# Patient Record
Sex: MALE | Race: WHITE | HISPANIC OR LATINO | Employment: FULL TIME | URBAN - METROPOLITAN AREA
[De-identification: names, ages, dates, MRNs, and addresses within clinical notes are randomized per-mention and may not be internally consistent; named-entity substitution may affect disease eponyms.]

---

## 2017-02-03 ENCOUNTER — OFFICE VISIT (OUTPATIENT)
Dept: ENDOCRINOLOGY | Facility: CLINIC | Age: 37
End: 2017-02-03

## 2017-02-03 VITALS
OXYGEN SATURATION: 96 % | SYSTOLIC BLOOD PRESSURE: 140 MMHG | HEIGHT: 65 IN | WEIGHT: 205 LBS | DIASTOLIC BLOOD PRESSURE: 84 MMHG | HEART RATE: 86 BPM | BODY MASS INDEX: 34.16 KG/M2

## 2017-02-03 DIAGNOSIS — M79.2 NEUROPATHIC PAIN: ICD-10-CM

## 2017-02-03 DIAGNOSIS — R79.89 ABNORMAL THYROID BLOOD TEST: Primary | ICD-10-CM

## 2017-02-03 LAB — TSH SERPL DL<=0.05 MIU/L-ACNC: 0.39 MIU/ML (ref 0.35–5.35)

## 2017-02-03 PROCEDURE — 84443 ASSAY THYROID STIM HORMONE: CPT | Performed by: INTERNAL MEDICINE

## 2017-02-03 PROCEDURE — 99202 OFFICE O/P NEW SF 15 MIN: CPT | Performed by: INTERNAL MEDICINE

## 2017-02-03 RX ORDER — DULOXETIN HYDROCHLORIDE 60 MG/1
60 CAPSULE, DELAYED RELEASE ORAL DAILY
Qty: 30 CAPSULE | Refills: 5 | Status: SHIPPED | OUTPATIENT
Start: 2017-02-03 | End: 2019-02-11

## 2017-02-03 NOTE — PROGRESS NOTES
Thyroid Problem (New pt, referred by  for abnormal thyroid labs. C/o not being able to sleep well at night. )    Leah Randhawa is a 36 y.o. male. he is being seen for consultation today at the request of  Camilla Durham A*     Consultation for abnormal thyroid function test in 10/16/2016 patient had a TSH of 0.3 with normal T4 of 9 free thyroxine index of 2.5 and normal thyroxine thyroid peroxidase antibodies.    Thyroglobulin antibodies were slightly elevated at 1.9 and this patient at this time patient presented for flareup of [employer] policy which he has since 2005. He received prednisone taper    [place] palsy dx in 2005, recurrent. In Oct 2016 he had flare up and   Patient lost his insurance and received isoniazid.     He c/o fatigue, no other sx to suggest hypothyroidism. Negative FH of thyroid disease,       History of Present Illness    Review of Systems  Review of Systems   Constitutional: Positive for fatigue and unexpected weight change (weight gain). Negative for activity change, appetite change, chills and diaphoresis.   HENT: Negative for congestion, ear pain, facial swelling, hearing loss, postnasal drip, sore throat, trouble swallowing and voice change.    Eyes: Negative.  Negative for redness, itching and visual disturbance.   Respiratory: Negative for cough and shortness of breath.    Cardiovascular: Negative for chest pain, palpitations and leg swelling.   Gastrointestinal: Negative for abdominal distention, abdominal pain, constipation, diarrhea, nausea and vomiting.   Endocrine:        As listed in HPI   Genitourinary: Negative.    Musculoskeletal: Positive for arthralgias and back pain. Negative for joint swelling, myalgias, neck pain and neck stiffness.   Skin: Negative.         Itching of the hands and feet.    Allergic/Immunologic: Negative.    Neurological: Positive for weakness (left sided facial weakness). Negative for dizziness, tremors, syncope,  "light-headedness and headaches.   Hematological: Negative.    Psychiatric/Behavioral: Positive for sleep disturbance.   All other systems reviewed and are negative.    Current medications:  Current Outpatient Prescriptions   Medication Sig Dispense Refill   • DULoxetine (CYMBALTA) 60 MG capsule Take 1 capsule by mouth Daily. 30 capsule 5     No current facility-administered medications for this visit.        The following portions of the patient's history were reviewed and updated as appropriate: He  has a past medical history of Bell's palsy and Obesity.  He  has a past surgical history that includes No past surgeries.  His family history includes Diabetes in his mother.  He  reports that he has never smoked. He does not have any smokeless tobacco history on file. He reports that he does not drink alcohol or use illicit drugs.  He is allergic to gabapentin..        Objective      Vitals:    02/03/17 0902   BP: 140/84   Pulse: 86   SpO2: 96%   Weight: 205 lb (93 kg)   Height: 65\" (165.1 cm)   Body mass index is 34.11 kg/(m^2).  Physical Exam   Constitutional: He is oriented to person, place, and time. He appears well-developed and well-nourished.   HENT:   Head: Normocephalic and atraumatic.   Mouth/Throat: Oropharynx is clear and moist.   Eyes: Conjunctivae are normal.   Neck: Normal range of motion. No muscular tenderness present. Carotid bruit is not present. No thyroid mass and no thyromegaly present.   The neck is supple and no assimetry visualized   Cardiovascular: Normal rate, regular rhythm and normal heart sounds.    Pulmonary/Chest: Effort normal and breath sounds normal.   Musculoskeletal: He exhibits no edema.   Lymphadenopathy:     He has no cervical adenopathy.   Neurological: He is alert and oriented to person, place, and time.   Skin: Skin is warm. No rash noted.   Psychiatric: He has a normal mood and affect. Thought content normal.   Vitals reviewed.      LABS AND IMAGING  No visits with results " within 1 Month(s) from this visit.  Latest known visit with results is:    Office Visit on 10/12/2016   Component Date Value Ref Range Status   • Thyroid Peroxidase Antibody 10/12/2016 12  0 - 34 IU/mL Final   • Thyroglobulin Ab 10/12/2016 1.9* 0.0 - 0.9 IU/mL Final    Thyroglobulin Antibody measured by [facility] Methodology   • TSH 10/12/2016 0.312* 0.350 - 5.350 mIU/mL Final   • T3 Uptake 10/12/2016 27.8  23.0 - 37.0 % Final   • T4, Total 10/12/2016 9.00  4.70 - 11.40 mcg/dL Final   • Free Thyroxine Index 10/12/2016 2.5  TBI Final       Assessment/Plan       Problem List Items Addressed This Visit        Other    Abnormal thyroid blood test - Primary    Relevant Orders    TSH      Other Visit Diagnoses     Neuropathic pain        Relevant Medications    DULoxetine (CYMBALTA) 60 MG capsule             PLAN  Repeat TSH. Mildly suppressed TSH - likely presentation of sick euthyroid syndrome or steroid effect.     Orders Placed This Encounter   Procedures   • TSH       Follow-up: prn.

## 2019-02-11 ENCOUNTER — OFFICE VISIT (OUTPATIENT)
Dept: FAMILY MEDICINE CLINIC | Facility: CLINIC | Age: 39
End: 2019-02-11

## 2019-02-11 VITALS
HEART RATE: 105 BPM | DIASTOLIC BLOOD PRESSURE: 70 MMHG | OXYGEN SATURATION: 97 % | WEIGHT: 192.2 LBS | BODY MASS INDEX: 30.89 KG/M2 | RESPIRATION RATE: 18 BRPM | TEMPERATURE: 97.4 F | SYSTOLIC BLOOD PRESSURE: 124 MMHG | HEIGHT: 66 IN

## 2019-02-11 DIAGNOSIS — Z00.00 HEALTHCARE MAINTENANCE: Primary | ICD-10-CM

## 2019-02-11 LAB
ALBUMIN SERPL-MCNC: 4.82 G/DL (ref 3.2–4.8)
ALBUMIN/GLOB SERPL: 1.9 G/DL (ref 1.5–2.5)
ALP SERPL-CCNC: 78 U/L (ref 25–100)
ALT SERPL W P-5'-P-CCNC: 31 U/L (ref 7–40)
ANION GAP SERPL CALCULATED.3IONS-SCNC: 5 MMOL/L (ref 3–11)
ARTICHOKE IGE QN: 119 MG/DL (ref 0–130)
AST SERPL-CCNC: 20 U/L (ref 0–33)
BASOPHILS # BLD AUTO: 0.04 10*3/MM3 (ref 0–0.2)
BASOPHILS NFR BLD AUTO: 0.5 % (ref 0–1)
BILIRUB BLD-MCNC: NEGATIVE MG/DL
BILIRUB SERPL-MCNC: 0.5 MG/DL (ref 0.3–1.2)
BUN BLD-MCNC: 18 MG/DL (ref 9–23)
BUN/CREAT SERPL: 15.5 (ref 7–25)
CALCIUM SPEC-SCNC: 9.8 MG/DL (ref 8.7–10.4)
CHLORIDE SERPL-SCNC: 103 MMOL/L (ref 99–109)
CHOLEST SERPL-MCNC: 166 MG/DL (ref 0–200)
CLARITY, POC: CLEAR
CO2 SERPL-SCNC: 31 MMOL/L (ref 20–31)
COLOR UR: YELLOW
CREAT BLD-MCNC: 1.16 MG/DL (ref 0.6–1.3)
DEPRECATED RDW RBC AUTO: 40.5 FL (ref 37–54)
EOSINOPHIL # BLD AUTO: 0.06 10*3/MM3 (ref 0–0.3)
EOSINOPHIL NFR BLD AUTO: 0.7 % (ref 0–3)
ERYTHROCYTE [DISTWIDTH] IN BLOOD BY AUTOMATED COUNT: 12.7 % (ref 11.3–14.5)
GFR SERPL CREATININE-BSD FRML MDRD: 70 ML/MIN/1.73
GLOBULIN UR ELPH-MCNC: 2.6 GM/DL
GLUCOSE BLD-MCNC: 118 MG/DL (ref 70–100)
GLUCOSE UR STRIP-MCNC: NEGATIVE MG/DL
HBA1C MFR BLD: 5 % (ref 4.8–5.6)
HCT VFR BLD AUTO: 45.7 % (ref 38.9–50.9)
HDLC SERPL-MCNC: 39 MG/DL (ref 40–60)
HGB BLD-MCNC: 15.6 G/DL (ref 13.1–17.5)
HIV1+2 AB SER QL: NORMAL
IMM GRANULOCYTES # BLD AUTO: 0.03 10*3/MM3 (ref 0–0.05)
IMM GRANULOCYTES NFR BLD AUTO: 0.3 % (ref 0–0.6)
KETONES UR QL: NEGATIVE
LEUKOCYTE EST, POC: NEGATIVE
LYMPHOCYTES # BLD AUTO: 2.77 10*3/MM3 (ref 0.6–4.8)
LYMPHOCYTES NFR BLD AUTO: 31.9 % (ref 24–44)
MCH RBC QN AUTO: 29.8 PG (ref 27–31)
MCHC RBC AUTO-ENTMCNC: 34.1 G/DL (ref 32–36)
MCV RBC AUTO: 87.4 FL (ref 80–99)
MONOCYTES # BLD AUTO: 0.43 10*3/MM3 (ref 0–1)
MONOCYTES NFR BLD AUTO: 5 % (ref 0–12)
NEUTROPHILS # BLD AUTO: 5.38 10*3/MM3 (ref 1.5–8.3)
NEUTROPHILS NFR BLD AUTO: 61.9 % (ref 41–71)
NITRITE UR-MCNC: NEGATIVE MG/ML
PH UR: 8.5 [PH] (ref 5–8)
PLATELET # BLD AUTO: 350 10*3/MM3 (ref 150–450)
PMV BLD AUTO: 10.8 FL (ref 6–12)
POC CREATININE URINE: 150
POC MICROALBUMIN URINE: 30
POTASSIUM BLD-SCNC: 3.8 MMOL/L (ref 3.5–5.5)
PROT SERPL-MCNC: 7.4 G/DL (ref 5.7–8.2)
PROT UR STRIP-MCNC: NEGATIVE MG/DL
RBC # BLD AUTO: 5.23 10*6/MM3 (ref 4.2–5.76)
RBC # UR STRIP: NEGATIVE /UL
SODIUM BLD-SCNC: 139 MMOL/L (ref 132–146)
SP GR UR: 1 (ref 1–1.03)
T4 FREE SERPL-MCNC: 1.35 NG/DL (ref 0.89–1.76)
TRIGL SERPL-MCNC: 133 MG/DL (ref 0–150)
TSH SERPL DL<=0.05 MIU/L-ACNC: 0.1 MIU/ML (ref 0.35–5.35)
URATE SERPL-MCNC: 6.8 MG/DL (ref 3.7–9.2)
UROBILINOGEN UR QL: NORMAL
WBC NRBC COR # BLD: 8.68 10*3/MM3 (ref 3.5–10.8)

## 2019-02-11 PROCEDURE — 84443 ASSAY THYROID STIM HORMONE: CPT | Performed by: FAMILY MEDICINE

## 2019-02-11 PROCEDURE — 80053 COMPREHEN METABOLIC PANEL: CPT | Performed by: FAMILY MEDICINE

## 2019-02-11 PROCEDURE — 36415 COLL VENOUS BLD VENIPUNCTURE: CPT | Performed by: FAMILY MEDICINE

## 2019-02-11 PROCEDURE — 84550 ASSAY OF BLOOD/URIC ACID: CPT | Performed by: FAMILY MEDICINE

## 2019-02-11 PROCEDURE — G0432 EIA HIV-1/HIV-2 SCREEN: HCPCS | Performed by: FAMILY MEDICINE

## 2019-02-11 PROCEDURE — 85025 COMPLETE CBC W/AUTO DIFF WBC: CPT | Performed by: FAMILY MEDICINE

## 2019-02-11 PROCEDURE — 84480 ASSAY TRIIODOTHYRONINE (T3): CPT | Performed by: FAMILY MEDICINE

## 2019-02-11 PROCEDURE — 84439 ASSAY OF FREE THYROXINE: CPT | Performed by: FAMILY MEDICINE

## 2019-02-11 PROCEDURE — 80061 LIPID PANEL: CPT | Performed by: FAMILY MEDICINE

## 2019-02-11 PROCEDURE — 81003 URINALYSIS AUTO W/O SCOPE: CPT | Performed by: FAMILY MEDICINE

## 2019-02-11 PROCEDURE — 83036 HEMOGLOBIN GLYCOSYLATED A1C: CPT | Performed by: FAMILY MEDICINE

## 2019-02-11 PROCEDURE — 99395 PREV VISIT EST AGE 18-39: CPT | Performed by: FAMILY MEDICINE

## 2019-02-11 PROCEDURE — 82044 UR ALBUMIN SEMIQUANTITATIVE: CPT | Performed by: FAMILY MEDICINE

## 2019-02-11 NOTE — PROGRESS NOTES
Leah Randhawa is a 38 y.o. male.     History of Present Illness   He is here for his annual fasting wellness evaluation.  He is current on his immunizations.  He voices no acute symptoms.  He describes non-compliance with his endocrinology follow up.    Review of Systems   Constitutional: Negative.    HENT: Positive for congestion and postnasal drip.    Eyes: Negative.    Respiratory: Negative.    Cardiovascular: Negative.    Gastrointestinal: Negative.    Endocrine: Negative.    Genitourinary: Negative.    Musculoskeletal: Negative.    Skin: Negative.    Allergic/Immunologic: Negative.    Neurological: Negative.    Hematological: Negative.    Psychiatric/Behavioral: Negative.        Objective   Physical Exam   Constitutional: He is oriented to person, place, and time. He appears well-developed and well-nourished. He is cooperative.   HENT:   Head: Normocephalic and atraumatic.   Right Ear: Hearing and external ear normal.   Left Ear: Hearing and external ear normal.   Nose: Mucosal edema and rhinorrhea present.   Mouth/Throat: Uvula is midline, oropharynx is clear and moist and mucous membranes are normal.   Eyes: Conjunctivae and EOM are normal. Pupils are equal, round, and reactive to light. No scleral icterus.   Neck: Trachea normal and normal range of motion. Neck supple. No JVD present. Carotid bruit is not present. No thyromegaly present.   Cardiovascular: Normal rate, regular rhythm, normal heart sounds and intact distal pulses.   Pulmonary/Chest: Effort normal and breath sounds normal.   Abdominal: Soft. Bowel sounds are normal. There is no hepatosplenomegaly. There is no tenderness.   Musculoskeletal: Normal range of motion.   Lymphadenopathy:     He has no cervical adenopathy.   Neurological: He is alert and oriented to person, place, and time. He has normal strength and normal reflexes. No sensory deficit. Gait normal.   Skin: Skin is warm and dry.   Psychiatric: He has a normal mood and  affect. His speech is normal and behavior is normal. Judgment and thought content normal. Cognition and memory are normal.   Nursing note and vitals reviewed.      Assessment/Plan   Diagnoses and all orders for this visit:    Healthcare maintenance  -     POC Urinalysis Dipstick, Automated  -     Comprehensive Metabolic Panel  -     CBC & Differential  -     POC Microalbumin  -     Lipid Panel  -     TSH  -     T4, Free  -     T3  -     Uric Acid  -     HIV-1 / O / 2 Ag / Antibody 4th Generation  -     Hemoglobin A1c    The patient is here for a health maintenance visit.  Currently, the patient consumes a calorie enriched diet and has an inadequate exercise regimen. Screening lab work is ordered.  Immunizations are reported as current.  Advice and education is given regarding nutrition, aerobic exercise, routine dental evaluations, routine eye exams, reproductive health, cardiovascular risk reduction, sunscreen use, self skin examination (annual dermatology evaluations) and seat belt use (general overall safety).  Further recommendations after lab evaluation.  Annual wellness evaluations recommended.

## 2019-02-13 LAB — T3 SERPL-MCNC: 156 NG/DL (ref 71–180)

## 2019-03-01 ENCOUNTER — TELEPHONE (OUTPATIENT)
Dept: FAMILY MEDICINE CLINIC | Facility: CLINIC | Age: 39
End: 2019-03-01

## 2019-03-01 NOTE — TELEPHONE ENCOUNTER
----- Message from Bri Fitzgerald sent at 3/1/2019 12:31 PM EST -----  Regarding: QUESTIONS ABOUT LAB RESULTS  Contact: [phone]  Patient called and has questions about his lab results.  Please call and advise @ [phone].  Thank you.

## 2019-07-29 ENCOUNTER — OFFICE VISIT (OUTPATIENT)
Dept: NEUROLOGY | Facility: CLINIC | Age: 39
End: 2019-07-29

## 2019-07-29 VITALS
HEART RATE: 88 BPM | SYSTOLIC BLOOD PRESSURE: 148 MMHG | OXYGEN SATURATION: 100 % | HEIGHT: 61 IN | DIASTOLIC BLOOD PRESSURE: 90 MMHG | BODY MASS INDEX: 39.08 KG/M2 | WEIGHT: 207 LBS

## 2019-07-29 DIAGNOSIS — M54.2 CERVICAL PAIN (NECK): Primary | ICD-10-CM

## 2019-07-29 DIAGNOSIS — M54.2 MUSCULOSKELETAL NECK PAIN: ICD-10-CM

## 2019-07-29 DIAGNOSIS — R22.1 LOCALIZED SWELLING, MASS AND LUMP, NECK: ICD-10-CM

## 2019-07-29 PROBLEM — M54.81 BILATERAL OCCIPITAL NEURALGIA: Status: ACTIVE | Noted: 2019-07-29

## 2019-07-29 PROCEDURE — 99214 OFFICE O/P EST MOD 30 MIN: CPT | Performed by: NURSE PRACTITIONER

## 2019-07-29 RX ORDER — CYCLOBENZAPRINE HCL 5 MG
5 TABLET ORAL NIGHTLY PRN
Qty: 30 TABLET | Refills: 1 | Status: SHIPPED | OUTPATIENT
Start: 2019-07-29

## 2019-07-29 RX ORDER — NAPROXEN 500 MG/1
500 TABLET ORAL 2 TIMES DAILY WITH MEALS
Qty: 28 TABLET | Refills: 0 | Status: SHIPPED | OUTPATIENT
Start: 2019-07-29

## 2019-07-29 RX ORDER — PREDNISONE 10 MG/1
TABLET ORAL
Qty: 42 TABLET | Refills: 0 | Status: SHIPPED | OUTPATIENT
Start: 2019-07-29 | End: 2019-09-10 | Stop reason: HOSPADM

## 2019-07-29 NOTE — PROGRESS NOTES
Subjective:     Patient ID: Feliz Randhawa is a 39 y.o. male.    CC:   Chief Complaint   Patient presents with   • Neck Pain     nerve pain       HPI:   History of Present Illness   This is a pleasant 39-year-old female who presents for neurology follow-up on new and worsening symptoms.  He was initially seen in our office in 2016 for chronic left Bell's palsy present since 2005.  He also had neuralgia as in the left face, left arm and somewhat moving to the right side at that time.  He did have an MRI of the brain without contrast on 8/18/2016 which was within normal limits and cranial nerves were within normal limits.  Previous labs in 2016 showed CRP normal, methylmalonic acid normal sed rate normal vitamin B12 on the low side at 342, hemoglobin A1c 4.9%, CMP within normal limits, DOUG with reflex negative, CBC with differential within normal limits and TSH on the low side of normal with normal T3 uptake and total T4.  Previously was treated with amitriptyline and gabapentin and did not tolerate either of these medications.  We did try Cymbalta but he also was not able to tolerate this.  Unfortunately he was lost to follow-up until today.    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  He tells me that he feels like his neck swells and he has neck pain in the back of his neck going into his shoulders.  He has not had any imaging of his neck.  He has not tried any physical therapy or conservative treatment.  He denies any specific injuries but does feel like it is related to tension and tightness.  He tells me that when his wife will massage his neck he feels a lot of tension release. He notices tightness, moderate dull and achy pain, numbness and tingling up into his head and sometimes into his arms but no weakness.     He tells me also that he feels like his neck swells and  gets swollen lymph nodes and he has not had this addressed by his PCP.  He tells me this comes and goes and sometimes he feels like it is hard to swallow.  He did have labs completed in February 2019 with his PCP and CBC with differential was within normal limits, CMP was showing a glucose of 118 but otherwise normal, lipid panel total cholesterol 166, triglycerides 133, HDL 39 and , TSH still low at 0.105, free T4 normal at 1.35, T3 normal at 156, uric acid normal at 6.8, HIV negative, hemoglobin A1c 5.0 normal, urine microalbumin normal.  He has not seen his PCP for follow-up or issues with the neck swelling and lymph node.    The following portions of the patient's history were reviewed and updated as appropriate: allergies, current medications, past family history, past medical history, past social history, past surgical history and problem list.    Past Medical History:   Diagnosis Date   • Bell's palsy    • Obesity        Past Surgical History:   Procedure Laterality Date   • NO PAST SURGERIES         Social History     Socioeconomic History   • Marital status: Significant Other     Spouse name: Sasha   • Number of children: 1   • Years of education: H.S.   • Highest education level: High school graduate   Occupational History   • Occupation: Maintenance     Employer: [employer]   Tobacco Use   • Smoking status: Never Smoker   • Smokeless tobacco: Never Used   Substance and Sexual Activity   • Alcohol use: No   • Drug use: No   • Sexual activity: Defer     Partners: Female       Family History   Problem Relation Age of Onset   • Diabetes Mother    • Nephrolithiasis Mother    • No Known Problems Father         Review of Systems   All other systems reviewed and are negative.       Objective:    Neurologic Exam     Mental Status   Oriented to person, place, and time.   Level of consciousness: alert    Cranial Nerves     CN II   Visual fields full to confrontation.     CN III, IV, VI    Pupils are equal, round, and reactive to light.  Extraocular motions are normal.     CN V   Facial sensation intact.     CN VII   Right facial weakness: none  Left facial weakness: peripheral (chronic left facial weakness from Bell's palsy)    CN VIII   CN VIII normal.     CN IX, X   CN IX normal.   CN X normal.     CN XI   CN XI normal.     CN XII   CN XII normal.     Motor Exam   Muscle bulk: normal  Overall muscle tone: normal    Strength   Strength 5/5 except as noted.   Right neck flexion: 4/5  Left neck flexion: 4/5  Right neck extension: 4/5  Left neck extension: 4/5    Sensory Exam   Light touch normal.   Vibration normal.   Proprioception normal.   Pinprick normal.     Gait, Coordination, and Reflexes     Gait  Gait: normal    Coordination   Finger to nose coordination: normal    Tremor   Resting tremor: absent  Intention tremor: absent  Action tremor: absent    Reflexes   Right brachioradialis: 2+  Left brachioradialis: 2+  Right biceps: 2+  Left biceps: 2+  Right triceps: 2+  Left triceps: 2+  Right patellar: 2+  Left patellar: 2+  Right achilles: 2+  Left achilles: 2+  Right : 2+  Left : 2+      Physical Exam   Constitutional: He is oriented to person, place, and time.   Eyes: EOM are normal. Pupils are equal, round, and reactive to light.   Neck: Muscular tenderness present. No spinous process tenderness present. No neck rigidity. Decreased range of motion present. No edema and no erythema present.   Musculoskeletal:        Cervical back: He exhibits decreased range of motion, tenderness, pain and spasm. He exhibits no swelling, no edema, no deformity and no laceration.   Lymphadenopathy:     He has cervical adenopathy (left-ordered u/s).   Neurological: He is oriented to person, place, and time. He has a normal Finger-Nose-Finger Test. Gait normal.   Reflex Scores:       Tricep reflexes are 2+ on the right side and 2+ on the left side.       Bicep reflexes are 2+ on the right side and 2+ on  the left side.       Brachioradialis reflexes are 2+ on the right side and 2+ on the left side.       Patellar reflexes are 2+ on the right side and 2+ on the left side.       Achilles reflexes are 2+ on the right side and 2+ on the left side.  Psychiatric: His mood appears anxious.       Assessment/Plan:       Feliz was seen today for neck pain.    Diagnoses and all orders for this visit:    Cervical pain (neck)  -     XR Spine Cervical 2 or 3 View; Future  -     predniSONE (DELTASONE) 10 MG tablet; Take 6 tabs x 2 days, Take 5 tabs x 2 days, take 4 tabs x 2 days, take 3 tabs x 2 days, take 2 tabs x 2 days and 1 tab x 2 days and stop  -     cyclobenzaprine (FLEXERIL) 5 MG tablet; Take 1 tablet by mouth At Night As Needed for Muscle Spasms.  -     naproxen (EC NAPROSYN) 500 MG EC tablet; Take 1 tablet by mouth 2 (Two) Times a Day With Meals.  -     Ambulatory Referral to Physical Therapy Evaluate and treat    Musculoskeletal neck pain  Comments:  if cannot afford PT consider massage or chiropractor    Localized swelling, mass and lump, neck  -     US head neck soft tissue; Future         He does have a high co-pay undetectable but I have encouraged him to try physical therapy for his neck pain as well as get an x-ray of his C-spine.  We will try a steroid lead for the next 12 days as well as Flexeril at night and naproxen for 2 weeks.  We are going to have him follow-up in 6 weeks and if he is not improving we would consider an MRI of the neck.  I also ordered an ultrasound of his neck soft tissue to evaluate the lymphadenopathy.  I have also encouraged him to follow-up with his primary care provider for further evaluation and work-up. Reviewed medications, potential side effects and signs and symptoms to report. Discussed risk versus benefits of treatment plan with patient and/or family-including medications, labs and radiology that may be ordered. Addressed questions and concerns during visit. Patient and/or  family verbalized understanding and agree with plan. No SOB or dysphagia.     During this visit the following were done:  Labs Reviewed [x]    Labs Ordered []    Radiology Reports Reviewed [x]    Radiology Ordered [x]    PCP Records Reviewed [x]    Referring Provider Records Reviewed []    ER Records Reviewed []    Hospital Records Reviewed []    History Obtained From Family []    Radiology Images Reviewed []    Other Reviewed []    Records Requested []      EMR Dragon/Transcription Disclaimer:  Much of this encounter note is an electronic transcription of spoken language to printed text. Electronic transcription of spoken language may permit erroneous words or phrases to be inadvertently transcribed. Although I have reviewed the note for such errors, some may still exist in this documentation.      Camilla Durham, APRN  7/29/2019

## 2019-08-01 ENCOUNTER — APPOINTMENT (OUTPATIENT)
Dept: ULTRASOUND IMAGING | Facility: HOSPITAL | Age: 39
End: 2019-08-01

## 2019-09-09 ENCOUNTER — APPOINTMENT (OUTPATIENT)
Dept: GENERAL RADIOLOGY | Facility: HOSPITAL | Age: 39
End: 2019-09-09

## 2019-09-09 ENCOUNTER — APPOINTMENT (OUTPATIENT)
Dept: CT IMAGING | Facility: HOSPITAL | Age: 39
End: 2019-09-09

## 2019-09-09 ENCOUNTER — OFFICE VISIT (OUTPATIENT)
Dept: NEUROLOGY | Facility: CLINIC | Age: 39
End: 2019-09-09

## 2019-09-09 ENCOUNTER — HOSPITAL ENCOUNTER (OUTPATIENT)
Facility: HOSPITAL | Age: 39
Setting detail: OBSERVATION
Discharge: HOME | End: 2019-09-10
Attending: EMERGENCY MEDICINE | Admitting: INTERNAL MEDICINE

## 2019-09-09 VITALS
SYSTOLIC BLOOD PRESSURE: 200 MMHG | DIASTOLIC BLOOD PRESSURE: 130 MMHG | OXYGEN SATURATION: 99 % | HEART RATE: 108 BPM | WEIGHT: 205 LBS | HEIGHT: 61 IN | BODY MASS INDEX: 38.71 KG/M2

## 2019-09-09 DIAGNOSIS — R55 NEAR SYNCOPE: ICD-10-CM

## 2019-09-09 DIAGNOSIS — M54.2 MUSCULOSKELETAL NECK PAIN: ICD-10-CM

## 2019-09-09 DIAGNOSIS — M54.2 CERVICAL SPINE PAIN: ICD-10-CM

## 2019-09-09 DIAGNOSIS — I16.0 HYPERTENSIVE URGENCY: ICD-10-CM

## 2019-09-09 DIAGNOSIS — G51.0 BELL'S PALSY: ICD-10-CM

## 2019-09-09 DIAGNOSIS — E87.6 HYPOKALEMIA: Primary | ICD-10-CM

## 2019-09-09 DIAGNOSIS — I16.0 HYPERTENSIVE URGENCY: Primary | ICD-10-CM

## 2019-09-09 DIAGNOSIS — R51.9 NONINTRACTABLE HEADACHE, UNSPECIFIED CHRONICITY PATTERN, UNSPECIFIED HEADACHE TYPE: ICD-10-CM

## 2019-09-09 LAB
ALBUMIN SERPL-MCNC: 4.8 G/DL (ref 3.5–5.2)
ALBUMIN/GLOB SERPL: 1.9 G/DL
ALP SERPL-CCNC: 76 U/L (ref 39–117)
ALT SERPL W P-5'-P-CCNC: 50 U/L (ref 1–41)
ANION GAP SERPL CALCULATED.3IONS-SCNC: 11 MMOL/L (ref 5–15)
AST SERPL-CCNC: 26 U/L (ref 1–40)
BASOPHILS # BLD AUTO: 0.07 10*3/MM3 (ref 0–0.2)
BASOPHILS NFR BLD AUTO: 0.8 % (ref 0–1.5)
BILIRUB SERPL-MCNC: 0.3 MG/DL (ref 0.2–1.2)
BILIRUB UR QL STRIP: NEGATIVE
BUN BLD-MCNC: 13 MG/DL (ref 6–20)
BUN/CREAT SERPL: 11 (ref 7–25)
CALCIUM SPEC-SCNC: 9.3 MG/DL (ref 8.6–10.5)
CHLORIDE SERPL-SCNC: 103 MMOL/L (ref 98–107)
CLARITY UR: CLEAR
CO2 SERPL-SCNC: 28 MMOL/L (ref 22–29)
COLOR UR: YELLOW
CREAT BLD-MCNC: 1.18 MG/DL (ref 0.76–1.27)
DEPRECATED RDW RBC AUTO: 36.7 FL (ref 37–54)
EOSINOPHIL # BLD AUTO: 0.03 10*3/MM3 (ref 0–0.4)
EOSINOPHIL NFR BLD AUTO: 0.3 % (ref 0.3–6.2)
ERYTHROCYTE [DISTWIDTH] IN BLOOD BY AUTOMATED COUNT: 11.9 % (ref 12.3–15.4)
GFR SERPL CREATININE-BSD FRML MDRD: 69 ML/MIN/1.73
GLOBULIN UR ELPH-MCNC: 2.5 GM/DL
GLUCOSE BLD-MCNC: 95 MG/DL (ref 65–99)
GLUCOSE UR STRIP-MCNC: NEGATIVE MG/DL
HCT VFR BLD AUTO: 44 % (ref 37.5–51)
HGB BLD-MCNC: 15.2 G/DL (ref 13–17.7)
HGB UR QL STRIP.AUTO: NEGATIVE
IMM GRANULOCYTES # BLD AUTO: 0.04 10*3/MM3 (ref 0–0.05)
IMM GRANULOCYTES NFR BLD AUTO: 0.5 % (ref 0–0.5)
KETONES UR QL STRIP: NEGATIVE
LEUKOCYTE ESTERASE UR QL STRIP.AUTO: NEGATIVE
LYMPHOCYTES # BLD AUTO: 2.64 10*3/MM3 (ref 0.7–3.1)
LYMPHOCYTES NFR BLD AUTO: 30.6 % (ref 19.6–45.3)
MCH RBC QN AUTO: 29.3 PG (ref 26.6–33)
MCHC RBC AUTO-ENTMCNC: 34.5 G/DL (ref 31.5–35.7)
MCV RBC AUTO: 84.9 FL (ref 79–97)
MONOCYTES # BLD AUTO: 0.46 10*3/MM3 (ref 0.1–0.9)
MONOCYTES NFR BLD AUTO: 5.3 % (ref 5–12)
NEUTROPHILS # BLD AUTO: 5.4 10*3/MM3 (ref 1.7–7)
NEUTROPHILS NFR BLD AUTO: 62.5 % (ref 42.7–76)
NITRITE UR QL STRIP: NEGATIVE
NRBC BLD AUTO-RTO: 0 /100 WBC (ref 0–0.2)
NT-PROBNP SERPL-MCNC: 55.3 PG/ML (ref 5–450)
PH UR STRIP.AUTO: 6.5 [PH] (ref 5–8)
PLATELET # BLD AUTO: 320 10*3/MM3 (ref 140–450)
PMV BLD AUTO: 9.7 FL (ref 6–12)
POTASSIUM BLD-SCNC: 3.2 MMOL/L (ref 3.5–5.2)
PROT SERPL-MCNC: 7.3 G/DL (ref 6–8.5)
PROT UR QL STRIP: NEGATIVE
RBC # BLD AUTO: 5.18 10*6/MM3 (ref 4.14–5.8)
SODIUM BLD-SCNC: 142 MMOL/L (ref 136–145)
SP GR UR STRIP: 1.02 (ref 1–1.03)
TROPONIN T SERPL-MCNC: <0.01 NG/ML (ref 0–0.03)
TSH SERPL DL<=0.05 MIU/L-ACNC: 0.53 UIU/ML (ref 0.27–4.2)
UROBILINOGEN UR QL STRIP: NORMAL
WBC NRBC COR # BLD: 8.64 10*3/MM3 (ref 3.4–10.8)

## 2019-09-09 PROCEDURE — 83880 ASSAY OF NATRIURETIC PEPTIDE: CPT | Performed by: PHYSICIAN ASSISTANT

## 2019-09-09 PROCEDURE — 80053 COMPREHEN METABOLIC PANEL: CPT | Performed by: PHYSICIAN ASSISTANT

## 2019-09-09 PROCEDURE — 96376 TX/PRO/DX INJ SAME DRUG ADON: CPT

## 2019-09-09 PROCEDURE — 84443 ASSAY THYROID STIM HORMONE: CPT | Performed by: PHYSICIAN ASSISTANT

## 2019-09-09 PROCEDURE — 99220 PR INITIAL OBSERVATION CARE/DAY 70 MINUTES: CPT | Performed by: INTERNAL MEDICINE

## 2019-09-09 PROCEDURE — 93005 ELECTROCARDIOGRAM TRACING: CPT | Performed by: PHYSICIAN ASSISTANT

## 2019-09-09 PROCEDURE — 72040 X-RAY EXAM NECK SPINE 2-3 VW: CPT

## 2019-09-09 PROCEDURE — 99285 EMERGENCY DEPT VISIT HI MDM: CPT

## 2019-09-09 PROCEDURE — 96374 THER/PROPH/DIAG INJ IV PUSH: CPT

## 2019-09-09 PROCEDURE — 99214 OFFICE O/P EST MOD 30 MIN: CPT | Performed by: NURSE PRACTITIONER

## 2019-09-09 PROCEDURE — 71045 X-RAY EXAM CHEST 1 VIEW: CPT

## 2019-09-09 PROCEDURE — 84484 ASSAY OF TROPONIN QUANT: CPT | Performed by: PHYSICIAN ASSISTANT

## 2019-09-09 PROCEDURE — 85025 COMPLETE CBC W/AUTO DIFF WBC: CPT | Performed by: PHYSICIAN ASSISTANT

## 2019-09-09 PROCEDURE — 81003 URINALYSIS AUTO W/O SCOPE: CPT | Performed by: PHYSICIAN ASSISTANT

## 2019-09-09 PROCEDURE — 70450 CT HEAD/BRAIN W/O DYE: CPT

## 2019-09-09 RX ORDER — AMLODIPINE BESYLATE 5 MG/1
5 TABLET ORAL
Status: DISCONTINUED | OUTPATIENT
Start: 2019-09-09 | End: 2019-09-10

## 2019-09-09 RX ORDER — POTASSIUM CHLORIDE 750 MG/1
40 CAPSULE, EXTENDED RELEASE ORAL ONCE
Status: COMPLETED | OUTPATIENT
Start: 2019-09-09 | End: 2019-09-09

## 2019-09-09 RX ORDER — LABETALOL HYDROCHLORIDE 5 MG/ML
10 INJECTION, SOLUTION INTRAVENOUS ONCE
Status: COMPLETED | OUTPATIENT
Start: 2019-09-09 | End: 2019-09-09

## 2019-09-09 RX ADMIN — POTASSIUM CHLORIDE 40 MEQ: 750 CAPSULE, EXTENDED RELEASE ORAL at 22:05

## 2019-09-09 RX ADMIN — LABETALOL 20 MG/4 ML (5 MG/ML) INTRAVENOUS SYRINGE 10 MG: at 23:40

## 2019-09-09 RX ADMIN — LABETALOL 20 MG/4 ML (5 MG/ML) INTRAVENOUS SYRINGE 10 MG: at 21:59

## 2019-09-09 NOTE — PROGRESS NOTES
Subjective:     Patient ID: Feliz Randhawa is a 39 y.o. male.    CC:   Chief Complaint   Patient presents with   • Neck Pain       HPI:   History of Present Illness   This is a pleasant 39-year-old female who presents for neurology follow-up on neck pain and tightness for 5 months.  He was initially seen in our office in 2016 for chronic left Bell's palsy present since 2005.  He also had neuralgia as in the left face, left arm and somewhat moving to the right side at that time.  He did have an MRI of the brain without contrast on 8/18/2016 which was within normal limits and cranial nerves were within normal limits.  Previous labs in 2016 showed CRP normal, methylmalonic acid normal sed rate normal vitamin B12 on the low side at 342, hemoglobin A1c 4.9%, CMP within normal limits, DOUG with reflex negative, CBC with differential within normal limits and TSH on the low side of normal with normal T3 uptake and total T4.  Previously was treated with amitriptyline and gabapentin and did not tolerate either of these medications.  We did try Cymbalta but he also was not able to tolerate this.      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  He tells me that he feels like his neck swells and he has neck pain in the back of his neck going into his shoulders.  He has not had any imaging of his neck.  He has not tried any physical therapy or conservative treatment.  He denies any specific injuries but does feel like it is related to tension and tightness.  He tells me that when his wife will massage his neck he feels a lot of tension release. He notices tightness, moderate dull and achy pain, numbness and tingling up into his head and sometimes into his arms but no weakness. Ordered xray c spine last visit and he could not afford the $700 copay and also ordered an u/s neck for swollen lymph node on left and could not afford. Canceled  both. Steroid and naproxen not helpful. Flexeril not helpful. Cannot get into PT with his work schedule.    HTN today significant and symptomatic-no hx HTN to his knowledge in past. /130 and 170/128 taken 15 minutes apart in visit. Had bad headache & yesterday. Has blurred vision and double vision yesterday and today. No slurred speech, acute weakness or paresthesias.     The following portions of the patient's history were reviewed and updated as appropriate: allergies, current medications, past family history, past medical history, past social history, past surgical history and problem list.    Past Medical History:   Diagnosis Date   • Bell's palsy    • Obesity        Past Surgical History:   Procedure Laterality Date   • NO PAST SURGERIES         Social History     Socioeconomic History   • Marital status: Significant Other     Spouse name: Sasha   • Number of children: 1   • Years of education: H.S.   • Highest education level: High school graduate   Occupational History   • Occupation: Maintenance     Employer: [employer]   Tobacco Use   • Smoking status: Never Smoker   • Smokeless tobacco: Never Used   Substance and Sexual Activity   • Alcohol use: No   • Drug use: No   • Sexual activity: Defer     Partners: Female       Family History   Problem Relation Age of Onset   • Diabetes Mother    • Nephrolithiasis Mother    • No Known Problems Father         Review of Systems   Constitutional: Negative for chills, fatigue, fever and unexpected weight change.   HENT: Negative for ear pain, hearing loss, nosebleeds, rhinorrhea and sore throat.    Eyes: Negative for photophobia, pain, discharge, itching and visual disturbance.   Respiratory: Negative for cough, chest tightness, shortness of breath and wheezing.    Cardiovascular: Negative for chest pain, palpitations and leg swelling.   Gastrointestinal: Negative for abdominal pain, blood in stool, constipation, diarrhea, nausea and vomiting.    Genitourinary: Negative for dysuria, frequency, hematuria and urgency.   Musculoskeletal: Negative for arthralgias, back pain, gait problem, joint swelling, myalgias, neck pain and neck stiffness.   Skin: Negative for rash and wound.   Allergic/Immunologic: Negative for environmental allergies and food allergies.   Neurological: Negative for dizziness, tremors, seizures, syncope, speech difficulty, weakness, light-headedness, numbness and headaches.   Hematological: Negative for adenopathy. Does not bruise/bleed easily.   Psychiatric/Behavioral: Negative for agitation, confusion, decreased concentration, hallucinations, sleep disturbance and suicidal ideas. The patient is not nervous/anxious.         Objective:    Neurologic Exam     Mental Status   Oriented to person, place, and time.   Speech: speech is normal   Level of consciousness: alert    Cranial Nerves     CN II   Visual acuity: decreased    CN III, IV, VI   Pupils are equal, round, and reactive to light.  Extraocular motions are normal.   Right pupil: Accommodation: intact.   Left pupil: Accommodation: intact.   CN III: no CN III palsy  CN VI: no CN VI palsy  Nystagmus: none   Diplopia: bilateral and horizontal  Ophthalmoparesis: none  Upgaze: normal  Downgaze: normal    CN V   Facial sensation intact.     CN VII   Right facial weakness: none  Left facial weakness: peripheral (chronic left facial weakness from Bell's palsy)    CN VIII   CN VIII normal.     CN IX, X   CN IX normal.   CN X normal.     CN XI   CN XI normal.     CN XII   CN XII normal.     Motor Exam   Muscle bulk: normal  Overall muscle tone: normal    Strength   Strength 5/5 except as noted.   Guarded movement of neck/c spine limited ROM     Gait, Coordination, and Reflexes     Gait  Gait: normal    Coordination   Romberg: negative  Finger to nose coordination: normal  Heel to shin coordination: normal  Tandem walking coordination: normal    Tremor   Resting tremor: absent  Intention  tremor: absent  Action tremor: absent    Reflexes   Right brachioradialis: 2+  Left brachioradialis: 2+  Right biceps: 2+  Left biceps: 2+  Right triceps: 2+  Left triceps: 2+  Right patellar: 2+  Left patellar: 2+  Right achilles: 2+  Left achilles: 2+  Right : 2+  Left : 2+      Physical Exam   Constitutional: He is oriented to person, place, and time.   Eyes: EOM are normal. Pupils are equal, round, and reactive to light.   Fundoscopic exam:       The right eye shows red reflex.        The left eye shows red reflex.   Lymphadenopathy:     He has cervical adenopathy (left-recommend u/s-cannot afford).   Neurological: He is oriented to person, place, and time. He has a normal Finger-Nose-Finger Test, a normal Heel to Shin Test, a normal Romberg Test and a normal Tandem Gait Test. Gait normal.   Reflex Scores:       Tricep reflexes are 2+ on the right side and 2+ on the left side.       Bicep reflexes are 2+ on the right side and 2+ on the left side.       Brachioradialis reflexes are 2+ on the right side and 2+ on the left side.       Patellar reflexes are 2+ on the right side and 2+ on the left side.       Achilles reflexes are 2+ on the right side and 2+ on the left side.  Psychiatric: His speech is normal and behavior is normal. Judgment and thought content normal. His mood appears anxious. Cognition and memory are normal.       Assessment/Plan:       Feliz was seen today for neck pain.    Diagnoses and all orders for this visit:    Hypertensive urgency  Comments:  /128 and 200/130 in office with dizziness and headache-symptomatic. Sent to [facility] by private car for further eval.    Musculoskeletal neck pain  Comments:  cannot afford xray or PT. muscle relaxer not helpful.         MS neck pain. Cannot afford xrays/PT at this time-will f/u in 4 weeks for other options. HTN urgency-symptomatic sent to [facility]-he wants to go by private car, has some blurred and double vision  but otherwise normal neuro exam. Declines EMS/Ambulance-accepts risks of driving on own. Encouraged him to get u/s neck and f/u with PCP. Reviewed medications, potential side effects and signs and symptoms to report. Discussed risk versus benefits of treatment plan with patient and/or family-including medications, labs and radiology that may be ordered. Addressed questions and concerns during visit. Patient and/or family verbalized understanding and agree with plan.    EMR Dragon/Transcription Disclaimer:  Much of this encounter note is an electronic transcription of spoken language to printed text. Electronic transcription of spoken language may permit erroneous words or phrases to be inadvertently transcribed. Although I have reviewed the note for such errors, some may still exist in this documentation.      Camilla Durham, APRN  9/9/2019

## 2019-09-09 NOTE — ED PROVIDER NOTES
Subjective   Mr. Feliz Randhawa is a 39 y.o. male who presents to the ED with c/o hypertension. He reports his blood pressure has been elevated recently. He does not have a history of hypertension and does not take any antihypertensives. He also has a history of Bell's Palsy since 2005 and has chronic numbness and tingling to the left side of his face. Recently, the numbness and tingling has extended to his left arm, left leg, spine, and the back of his neck. He also complains of headaches for the past 3 weeks, dizziness since yesterday, neck pain, and shortness of breath. He describes the headaches as pressure over the top of his head and behind his eyes and yesterday he took Advil for the pain. He states he felt like he may pass out this morning secondary to dizziness. He is scheduled for an xray of his neck. He also notes when he is sleeping on his back he has difficulty breathing. He denies chest pain and unilateral weakness. He has had 2 MRI's to his head and neck when he was diagnosed with Bell's Palsy. He states he has not been sleeping well for the past few months. He visited the office of his primary care provider, Dr. Bartlett, today who advised him to the ED. He is a nonsmoker. There are no other acute complaints at this time.        History provided by:  Patient  Hypertension   Severity:  Moderate  Timing:  Constant  Chronicity:  New  Context: not medication change (the patient does not take medicatinon for hypertension)    Relieved by:  None tried  Ineffective treatments:  None tried  Associated symptoms: dizziness, headaches, neck pain and shortness of breath    Associated symptoms: no blurred vision, no chest pain, no palpitations and no weakness    Risk factors: obesity    Risk factors: no family history of hypertension        Review of Systems   Eyes: Negative for blurred vision.   Respiratory: Positive for shortness of breath. Negative for chest tightness.    Cardiovascular: Negative for chest pain,  palpitations and leg swelling.   Musculoskeletal: Positive for neck pain. Negative for back pain.   Neurological: Positive for dizziness, numbness (left side) and headaches. Negative for syncope and weakness.        Sxs of near syncope   All other systems reviewed and are negative.      Past Medical History:   Diagnosis Date   • Bell's palsy    • Obesity        Allergies   Allergen Reactions   • Gabapentin Rash       Past Surgical History:   Procedure Laterality Date   • NO PAST SURGERIES         Family History   Problem Relation Age of Onset   • Diabetes Mother    • Nephrolithiasis Mother    • No Known Problems Father        Social History     Socioeconomic History   • Marital status: Significant Other     Spouse name: Sasha   • Number of children: 1   • Years of education: H.S.   • Highest education level: High school graduate   Occupational History   • Occupation: Maintenance     Employer: [employer]   Tobacco Use   • Smoking status: Never Smoker   • Smokeless tobacco: Never Used   Substance and Sexual Activity   • Alcohol use: No   • Drug use: No   • Sexual activity: Defer     Partners: Female         Objective   Physical Exam   Constitutional: He is oriented to person, place, and time. He appears well-developed and well-nourished.   Pleasant male.   HENT:   Head: Normocephalic and atraumatic.   Nose: Nose normal.   Normal vessels and normal disc in cup.   Eyes: Conjunctivae are normal. Pupils are equal, round, and reactive to light. No scleral icterus. Right eye exhibits no nystagmus. Left eye exhibits no nystagmus.   Neck: Normal range of motion. Neck supple. Carotid bruit is not present.   Point tenderness along the cervical spine.  Full range of motion but pain is illicited.   Cardiovascular: Normal rate, regular rhythm and normal heart sounds.   No murmur heard.  No pedal edema to the lower extremities.   Pulmonary/Chest: Effort normal and breath sounds normal. No respiratory distress.    Abdominal: Soft. There is no tenderness.   Musculoskeletal: Normal range of motion.   Neurological: He is alert and oriented to person, place, and time.   Equal  strength 5/5.  Equal muscle strength 5/5 to the lower extremities.  No focal deficits.   Skin: Skin is warm and dry.   Psychiatric: He has a normal mood and affect. His behavior is normal.   Nursing note and vitals reviewed.      Procedures         ED Course  ED Course as of Sep 10 0030   Mon Sep 09, 2019   2145 EKG showed normal sinus rhythm.  There was no evidence of acute ST-T wave changes consistent with ischemia.  CBC and chemistries were essentially normal.  Potassium was slightly decreased at 3.2.  TSH is normal at 0.530.  Troponin is less than 0.010.  Urinalysis shows no evidence of acute infectious process and no proteinuria.  Chest x-ray shows no active disease.   x-rays of the C-spine show no acute bony abnormality.  CT the brain without contrast showed no acute intracranial abnormality.  Discussed the case with Dr. Angela.  We will initiate labetalol 10 mg IV and observe blood pressure closely.  [FC]   2311 Blood pressure still elevated at 176/128 after 10 milligrams of labetalol.  We will give another 10 mg and then page the hospitalist to discuss admission for hypertensive urgency.  [FC]   2343 Dr. Murdock has been paged.-[facility]  [HF]   2347 Discussed the case with Dr. Murdock, hospitalist.  He is agreeable to admission on telemetry.  He recommended initiating Norvasc 5 mg by mouth x1 dose here in the ER.  They will continue to give IV medications as needed to help lower blood pressure.  He also will order carotid ultrasound from the morning.  Discussed admission with patient.  He is agreeable.  [FC]      ED Course User Index  [FC] Trinh Diaz PA-C  [HF] Cindi Adam       Recent Results (from the past 24 hour(s))   Comprehensive Metabolic Panel    Collection Time: 09/09/19  7:53 PM   Result Value Ref Range    Glucose 95 65 - 99  mg/dL    BUN 13 6 - 20 mg/dL    Creatinine 1.18 0.76 - 1.27 mg/dL    Sodium 142 136 - 145 mmol/L    Potassium 3.2 (L) 3.5 - 5.2 mmol/L    Chloride 103 98 - 107 mmol/L    CO2 28.0 22.0 - 29.0 mmol/L    Calcium 9.3 8.6 - 10.5 mg/dL    Total Protein 7.3 6.0 - 8.5 g/dL    Albumin 4.80 3.50 - 5.20 g/dL    ALT (SGPT) 50 (H) 1 - 41 U/L    AST (SGOT) 26 1 - 40 U/L    Alkaline Phosphatase 76 39 - 117 U/L    Total Bilirubin 0.3 0.2 - 1.2 mg/dL    eGFR Non African Amer 69 >60 mL/min/1.73    Globulin 2.5 gm/dL    A/G Ratio 1.9 g/dL    BUN/Creatinine Ratio 11.0 7.0 - 25.0    Anion Gap 11.0 5.0 - 15.0 mmol/L   Troponin    Collection Time: 09/09/19  7:53 PM   Result Value Ref Range    Troponin T <0.010 0.000 - 0.030 ng/mL   TSH    Collection Time: 09/09/19  7:53 PM   Result Value Ref Range    TSH 0.530 0.270 - 4.200 uIU/mL   BNP    Collection Time: 09/09/19  7:53 PM   Result Value Ref Range    proBNP 55.3 5.0 - 450.0 pg/mL   CBC Auto Differential    Collection Time: 09/09/19  7:53 PM   Result Value Ref Range    WBC 8.64 3.40 - 10.80 10*3/mm3    RBC 5.18 4.14 - 5.80 10*6/mm3    Hemoglobin 15.2 13.0 - 17.7 g/dL    Hematocrit 44.0 37.5 - 51.0 %    MCV 84.9 79.0 - 97.0 fL    MCH 29.3 26.6 - 33.0 pg    MCHC 34.5 31.5 - 35.7 g/dL    RDW 11.9 (L) 12.3 - 15.4 %    RDW-SD 36.7 (L) 37.0 - 54.0 fl    MPV 9.7 6.0 - 12.0 fL    Platelets 320 140 - 450 10*3/mm3    Neutrophil % 62.5 42.7 - 76.0 %    Lymphocyte % 30.6 19.6 - 45.3 %    Monocyte % 5.3 5.0 - 12.0 %    Eosinophil % 0.3 0.3 - 6.2 %    Basophil % 0.8 0.0 - 1.5 %    Immature Grans % 0.5 0.0 - 0.5 %    Neutrophils, Absolute 5.40 1.70 - 7.00 10*3/mm3    Lymphocytes, Absolute 2.64 0.70 - 3.10 10*3/mm3    Monocytes, Absolute 0.46 0.10 - 0.90 10*3/mm3    Eosinophils, Absolute 0.03 0.00 - 0.40 10*3/mm3    Basophils, Absolute 0.07 0.00 - 0.20 10*3/mm3    Immature Grans, Absolute 0.04 0.00 - 0.05 10*3/mm3    nRBC 0.0 0.0 - 0.2 /100 WBC   Urinalysis With Microscopic If Indicated (No Culture) -  Urine, Clean Catch    Collection Time: 09/09/19  8:22 PM   Result Value Ref Range    Color, UA Yellow Yellow, Straw    Appearance, UA Clear Clear    pH, UA 6.5 5.0 - 8.0    Specific Gravity, UA 1.017 1.001 - 1.030    Glucose, UA Negative Negative    Ketones, UA Negative Negative    Bilirubin, UA Negative Negative    Blood, UA Negative Negative    Protein, UA Negative Negative    Leuk Esterase, UA Negative Negative    Nitrite, UA Negative Negative    Urobilinogen, UA 0.2 E.U./dL 0.2 - 1.0 E.U./dL     Note: In addition to lab results from this visit, the labs listed above may include labs taken at another facility or during a different encounter within the last 24 hours. Please correlate lab times with ED admission and discharge times for further clarification of the services performed during this visit.    CT Head Without Contrast   Final Result   Normal, negative unenhanced head CT.               Signer Name: Sadiq Soares MD    Signed: 9/9/2019 9:18 PM    Workstation Name: [facility]     Radiology [facility]      XR Chest 1 View   Final Result      1. Negative chest. No significant change.      Signer Name: Sadiq Soares MD    Signed: 9/9/2019 8:22 PM    Workstation Name: [facility]     Radiology [facility]      XR Spine Cervical 2 or 3 View   Final Result   Negative cervical spine.      Signer Name: Sadiq Soares MD    Signed: 9/9/2019 8:20 PM    Workstation Name: [facility]     [facility]        Vitals:    09/09/19 2315 09/09/19 2330 09/10/19 0000 09/10/19 0001   BP:  (!) 184/129 (!) 187/133    BP Location:       Patient Position:       Pulse: 80 80  78   Resp:       Temp:       TempSrc:       SpO2: 94% 95%  97%   Weight:       Height:         Medications   amLODIPine (NORVASC) tablet 5 mg (not administered)   hydrALAZINE (APRESOLINE) injection 10 mg (not administered)   labetalol (NORMODYNE,TRANDATE) injection 10 mg (10 mg Intravenous Given 9/9/19  2159)   potassium chloride (MICRO-K) CR capsule 40 mEq (40 mEq Oral Given 9/9/19 2205)   labetalol (NORMODYNE,TRANDATE) injection 10 mg (10 mg Intravenous Given 9/9/19 2340)     ECG/EMG Results (last 24 hours)     ** No results found for the last 24 hours. **        ECG 12 Lead                           MDM    Final diagnoses:   Hypokalemia   Bell's palsy   Cervical spine pain   Hypertensive urgency   Nonintractable headache, unspecified chronicity pattern, unspecified headache type   Near syncope       Documentation assistance provided by angélica Adam.  Information recorded by the scribaugustin was done at my direction and has been verified and validated by me.     Cindi Adam  09/09/19 2011       Trinh Diaz PA-C  09/10/19 0030

## 2019-09-10 ENCOUNTER — APPOINTMENT (OUTPATIENT)
Dept: CARDIOLOGY | Facility: HOSPITAL | Age: 39
End: 2019-09-10

## 2019-09-10 VITALS
SYSTOLIC BLOOD PRESSURE: 131 MMHG | HEIGHT: 66 IN | BODY MASS INDEX: 32.62 KG/M2 | RESPIRATION RATE: 16 BRPM | DIASTOLIC BLOOD PRESSURE: 92 MMHG | OXYGEN SATURATION: 98 % | TEMPERATURE: 98 F | WEIGHT: 203 LBS | HEART RATE: 86 BPM

## 2019-09-10 PROBLEM — E87.6 HYPOKALEMIA: Status: ACTIVE | Noted: 2019-09-10

## 2019-09-10 PROBLEM — I16.0 HYPERTENSIVE URGENCY: Status: RESOLVED | Noted: 2019-09-09 | Resolved: 2019-09-10

## 2019-09-10 PROBLEM — E87.6 HYPOKALEMIA: Status: RESOLVED | Noted: 2019-09-09 | Resolved: 2019-09-10

## 2019-09-10 LAB
ANION GAP SERPL CALCULATED.3IONS-SCNC: 14 MMOL/L (ref 5–15)
BASOPHILS # BLD AUTO: 0.05 10*3/MM3 (ref 0–0.2)
BASOPHILS NFR BLD AUTO: 0.5 % (ref 0–1.5)
BH CV ECHO MEAS - BSA(HAYCOCK): 2.1 M^2
BH CV ECHO MEAS - BSA: 2 M^2
BH CV ECHO MEAS - BZI_BMI: 32.8 KILOGRAMS/M^2
BH CV ECHO MEAS - BZI_METRIC_HEIGHT: 167.6 CM
BH CV ECHO MEAS - BZI_METRIC_WEIGHT: 92.1 KG
BH CV XLRA MEAS LEFT CCA RATIO VEL: 93.6 CM/SEC
BH CV XLRA MEAS LEFT DIST CCA EDV: 22.3 CM/SEC
BH CV XLRA MEAS LEFT DIST CCA PSV: 76.8 CM/SEC
BH CV XLRA MEAS LEFT ICA RATIO VEL: 66.3 CM/SEC
BH CV XLRA MEAS LEFT ICA/CCA RATIO: 0.71
BH CV XLRA MEAS LEFT MID CCA EDV: 21 CM/SEC
BH CV XLRA MEAS LEFT MID CCA PSV: 94.3 CM/SEC
BH CV XLRA MEAS LEFT MID ICA EDV: 30 CM/SEC
BH CV XLRA MEAS LEFT MID ICA PSV: 67 CM/SEC
BH CV XLRA MEAS LEFT PROX CCA EDV: 21 CM/SEC
BH CV XLRA MEAS LEFT PROX CCA PSV: 101.3 CM/SEC
BH CV XLRA MEAS LEFT PROX ECA PSV: 102 CM/SEC
BH CV XLRA MEAS LEFT PROX ICA EDV: 23 CM/SEC
BH CV XLRA MEAS LEFT PROX ICA PSV: 58.7 CM/SEC
BH CV XLRA MEAS LEFT PROX SCLA PSV: 116.6 CM/SEC
BH CV XLRA MEAS LEFT VERTEBRAL A PSV: 37.3 CM/SEC
BH CV XLRA MEAS RIGHT CCA RATIO VEL: 67 CM/SEC
BH CV XLRA MEAS RIGHT DIST CCA EDV: 18.2 CM/SEC
BH CV XLRA MEAS RIGHT DIST CCA PSV: 75.4 CM/SEC
BH CV XLRA MEAS RIGHT DIST ICA EDV: 34.2 CM/SEC
BH CV XLRA MEAS RIGHT DIST ICA PSV: 86.6 CM/SEC
BH CV XLRA MEAS RIGHT ICA RATIO VEL: 83.1 CM/SEC
BH CV XLRA MEAS RIGHT ICA/CCA RATIO: 1.2
BH CV XLRA MEAS RIGHT MID CCA EDV: 17.5 CM/SEC
BH CV XLRA MEAS RIGHT MID CCA PSV: 67.7 CM/SEC
BH CV XLRA MEAS RIGHT MID ICA EDV: 25.8 CM/SEC
BH CV XLRA MEAS RIGHT MID ICA PSV: 65.6 CM/SEC
BH CV XLRA MEAS RIGHT PROX CCA EDV: 21.6 CM/SEC
BH CV XLRA MEAS RIGHT PROX CCA PSV: 88.4 CM/SEC
BH CV XLRA MEAS RIGHT PROX ECA PSV: 85.9 CM/SEC
BH CV XLRA MEAS RIGHT PROX ICA EDV: 23.7 CM/SEC
BH CV XLRA MEAS RIGHT PROX ICA PSV: 83.8 CM/SEC
BH CV XLRA MEAS RIGHT PROX SCLA PSV: 179.8 CM/SEC
BH CV XLRA MEAS RIGHT VERTEBRAL A PSV: 34.2 CM/SEC
BUN BLD-MCNC: 13 MG/DL (ref 6–20)
BUN/CREAT SERPL: 12.5 (ref 7–25)
CALCIUM SPEC-SCNC: 9.1 MG/DL (ref 8.6–10.5)
CHLORIDE SERPL-SCNC: 101 MMOL/L (ref 98–107)
CO2 SERPL-SCNC: 24 MMOL/L (ref 22–29)
CREAT BLD-MCNC: 1.04 MG/DL (ref 0.76–1.27)
DEPRECATED RDW RBC AUTO: 38.5 FL (ref 37–54)
EOSINOPHIL # BLD AUTO: 0.02 10*3/MM3 (ref 0–0.4)
EOSINOPHIL NFR BLD AUTO: 0.2 % (ref 0.3–6.2)
ERYTHROCYTE [DISTWIDTH] IN BLOOD BY AUTOMATED COUNT: 12 % (ref 12.3–15.4)
GFR SERPL CREATININE-BSD FRML MDRD: 80 ML/MIN/1.73
GLUCOSE BLD-MCNC: 166 MG/DL (ref 65–99)
HBA1C MFR BLD: 5.4 % (ref 4.8–5.6)
HCT VFR BLD AUTO: 47.3 % (ref 37.5–51)
HGB BLD-MCNC: 16 G/DL (ref 13–17.7)
IMM GRANULOCYTES # BLD AUTO: 0.05 10*3/MM3 (ref 0–0.05)
IMM GRANULOCYTES NFR BLD AUTO: 0.5 % (ref 0–0.5)
LEFT ARM BP: NORMAL MMHG
LYMPHOCYTES # BLD AUTO: 1.62 10*3/MM3 (ref 0.7–3.1)
LYMPHOCYTES NFR BLD AUTO: 16.2 % (ref 19.6–45.3)
MAGNESIUM SERPL-MCNC: 2.3 MG/DL (ref 1.6–2.6)
MCH RBC QN AUTO: 29.7 PG (ref 26.6–33)
MCHC RBC AUTO-ENTMCNC: 33.8 G/DL (ref 31.5–35.7)
MCV RBC AUTO: 87.8 FL (ref 79–97)
MONOCYTES # BLD AUTO: 0.43 10*3/MM3 (ref 0.1–0.9)
MONOCYTES NFR BLD AUTO: 4.3 % (ref 5–12)
NEUTROPHILS # BLD AUTO: 7.86 10*3/MM3 (ref 1.7–7)
NEUTROPHILS NFR BLD AUTO: 78.3 % (ref 42.7–76)
NRBC BLD AUTO-RTO: 0 /100 WBC (ref 0–0.2)
PLATELET # BLD AUTO: 313 10*3/MM3 (ref 140–450)
PMV BLD AUTO: 9.9 FL (ref 6–12)
POTASSIUM BLD-SCNC: 3.3 MMOL/L (ref 3.5–5.2)
RBC # BLD AUTO: 5.39 10*6/MM3 (ref 4.14–5.8)
SODIUM BLD-SCNC: 139 MMOL/L (ref 136–145)
WBC NRBC COR # BLD: 10.03 10*3/MM3 (ref 3.4–10.8)

## 2019-09-10 PROCEDURE — 85025 COMPLETE CBC W/AUTO DIFF WBC: CPT | Performed by: NURSE PRACTITIONER

## 2019-09-10 PROCEDURE — 93880 EXTRACRANIAL BILAT STUDY: CPT

## 2019-09-10 PROCEDURE — G0378 HOSPITAL OBSERVATION PER HR: HCPCS

## 2019-09-10 PROCEDURE — 96375 TX/PRO/DX INJ NEW DRUG ADDON: CPT

## 2019-09-10 PROCEDURE — 93880 EXTRACRANIAL BILAT STUDY: CPT | Performed by: INTERNAL MEDICINE

## 2019-09-10 PROCEDURE — 83036 HEMOGLOBIN GLYCOSYLATED A1C: CPT | Performed by: NURSE PRACTITIONER

## 2019-09-10 PROCEDURE — 99217 PR OBSERVATION CARE DISCHARGE MANAGEMENT: CPT | Performed by: INTERNAL MEDICINE

## 2019-09-10 PROCEDURE — 25010000002 HYDRALAZINE PER 20 MG: Performed by: NURSE PRACTITIONER

## 2019-09-10 PROCEDURE — 83735 ASSAY OF MAGNESIUM: CPT | Performed by: NURSE PRACTITIONER

## 2019-09-10 PROCEDURE — 80048 BASIC METABOLIC PNL TOTAL CA: CPT | Performed by: NURSE PRACTITIONER

## 2019-09-10 RX ORDER — CHOLECALCIFEROL (VITAMIN D3) 125 MCG
5 CAPSULE ORAL NIGHTLY PRN
Status: DISCONTINUED | OUTPATIENT
Start: 2019-09-10 | End: 2019-09-10 | Stop reason: HOSPADM

## 2019-09-10 RX ORDER — SODIUM CHLORIDE 0.9 % (FLUSH) 0.9 %
10 SYRINGE (ML) INJECTION AS NEEDED
Status: DISCONTINUED | OUTPATIENT
Start: 2019-09-10 | End: 2019-09-10 | Stop reason: HOSPADM

## 2019-09-10 RX ORDER — ACETAMINOPHEN 325 MG/1
650 TABLET ORAL EVERY 4 HOURS PRN
Status: DISCONTINUED | OUTPATIENT
Start: 2019-09-10 | End: 2019-09-10 | Stop reason: HOSPADM

## 2019-09-10 RX ORDER — LISINOPRIL 10 MG/1
10 TABLET ORAL
Status: DISCONTINUED | OUTPATIENT
Start: 2019-09-10 | End: 2019-09-10 | Stop reason: HOSPADM

## 2019-09-10 RX ORDER — ACETAMINOPHEN 160 MG/5ML
650 SOLUTION ORAL EVERY 4 HOURS PRN
Status: DISCONTINUED | OUTPATIENT
Start: 2019-09-10 | End: 2019-09-10 | Stop reason: HOSPADM

## 2019-09-10 RX ORDER — HYDRALAZINE HYDROCHLORIDE 20 MG/ML
10 INJECTION INTRAMUSCULAR; INTRAVENOUS ONCE
Status: COMPLETED | OUTPATIENT
Start: 2019-09-10 | End: 2019-09-10

## 2019-09-10 RX ORDER — SODIUM CHLORIDE 0.9 % (FLUSH) 0.9 %
10 SYRINGE (ML) INJECTION EVERY 12 HOURS SCHEDULED
Status: DISCONTINUED | OUTPATIENT
Start: 2019-09-10 | End: 2019-09-10 | Stop reason: HOSPADM

## 2019-09-10 RX ORDER — ACETAMINOPHEN 650 MG/1
650 SUPPOSITORY RECTAL EVERY 4 HOURS PRN
Status: DISCONTINUED | OUTPATIENT
Start: 2019-09-10 | End: 2019-09-10 | Stop reason: HOSPADM

## 2019-09-10 RX ORDER — LISINOPRIL 10 MG/1
10 TABLET ORAL
Qty: 30 TABLET | Refills: 0 | Status: SHIPPED | OUTPATIENT
Start: 2019-09-11 | End: 2019-09-11 | Stop reason: SDUPTHER

## 2019-09-10 RX ORDER — CYCLOBENZAPRINE HCL 10 MG
5 TABLET ORAL NIGHTLY PRN
Status: DISCONTINUED | OUTPATIENT
Start: 2019-09-10 | End: 2019-09-10 | Stop reason: HOSPADM

## 2019-09-10 RX ORDER — ACETAMINOPHEN, ASPIRIN AND CAFFEINE 250; 250; 65 MG/1; MG/1; MG/1
2 TABLET, FILM COATED ORAL ONCE
Status: COMPLETED | OUTPATIENT
Start: 2019-09-10 | End: 2019-09-10

## 2019-09-10 RX ORDER — AMLODIPINE BESYLATE 10 MG/1
10 TABLET ORAL
Status: DISCONTINUED | OUTPATIENT
Start: 2019-09-11 | End: 2019-09-10 | Stop reason: HOSPADM

## 2019-09-10 RX ORDER — AMLODIPINE BESYLATE 10 MG/1
10 TABLET ORAL
Qty: 30 TABLET | Refills: 0 | Status: SHIPPED | OUTPATIENT
Start: 2019-09-11 | End: 2019-09-11 | Stop reason: SDUPTHER

## 2019-09-10 RX ADMIN — AMLODIPINE BESYLATE 5 MG: 5 TABLET ORAL at 00:42

## 2019-09-10 RX ADMIN — HYDRALAZINE HYDROCHLORIDE 10 MG: 20 INJECTION INTRAMUSCULAR; INTRAVENOUS at 00:42

## 2019-09-10 RX ADMIN — ACETAMINOPHEN, ASPIRIN AND CAFFEINE 2 TABLET: 250; 250; 65 TABLET, FILM COATED ORAL at 11:30

## 2019-09-10 RX ADMIN — SODIUM CHLORIDE, PRESERVATIVE FREE 10 ML: 5 INJECTION INTRAVENOUS at 09:47

## 2019-09-10 RX ADMIN — ACETAMINOPHEN 650 MG: 325 TABLET ORAL at 09:50

## 2019-09-10 RX ADMIN — ACETAMINOPHEN 650 MG: 325 TABLET ORAL at 04:23

## 2019-09-10 RX ADMIN — LISINOPRIL 10 MG: 10 TABLET ORAL at 09:47

## 2019-09-10 NOTE — PROGRESS NOTES
Discharge Planning Assessment  [facility]     Patient Name: Feliz Randhawa  MRN: [id number]  Today's Date: 9/10/2019    Admit Date: 9/9/2019    Discharge Needs Assessment     Row Name 09/10/19 1005       Living Environment    Lives With  spouse    Current Living Arrangements  home/apartment/condo Lives in [place]    Primary Care Provided by  self    Provides Primary Care For  no one    Family Caregiver if Needed  spouse    Quality of Family Relationships  involved    Able to Return to Prior Arrangements  yes       Resource/Environmental Concerns    Resource/Environmental Concerns  none    Transportation Concerns  car, none       Transition Planning    Patient/Family Anticipates Transition to  home with family    Patient/Family Anticipated Services at Transition  none       Discharge Needs Assessment    Concerns to be Addressed  no discharge needs identified    Equipment Currently Used at Home  none    Anticipated Changes Related to Illness  none    Equipment Needed After Discharge  none        Discharge Plan     Row Name 09/10/19 1006       Plan    Plan  Home    Patient/Family in Agreement with Plan  yes    Plan Comments  Pt is independent of ADL's. Consult for d/c planning noted. Pt lives with his family in [place]. No DME. He plans to return home. Per  note, pt needs a sleep study for suspected CHARLOTTE. Ambulatory referral sent to [facility] medicine. Instructed pt that sleep medicine will call him with an appt. He was agreeable. No further d/c needs.  CM will follow.     Final Discharge Disposition Code  01 - home or self-care        Destination      No service coordination in this encounter.      Durable Medical Equipment      No service coordination in this encounter.      Dialysis/Infusion      No service coordination in this encounter.      Home Medical Care      No service coordination in this encounter.      Therapy      No service coordination in this encounter.      Community Resources       No service coordination in this encounter.        Expected Discharge Date and Time     Expected Discharge Date Expected Discharge Time    Sep 10, 2019         Demographic Summary     Row Name 09/10/19 1004       General Information    Referral Source  admission list    Preferred Language  English     Used During This Interaction  no    General Information Comments  PCP is Lloyd Bartlett       Contact Information    Permission Granted to Share Info With          Functional Status     Row Name 09/10/19 1004       Functional Status    Usual Activity Tolerance  good    Current Activity Tolerance  good       Functional Status, IADL    Medications  independent    Meal Preparation  independent    Housekeeping  independent    Laundry  independent    Shopping  independent       Employment/    Employment Status  employed full time    Employment/ Comments  Has [employer]        Psychosocial    No documentation.       Abuse/Neglect    No documentation.       Legal    No documentation.       Substance Abuse    No documentation.       Patient Forms    No documentation.           Laura Burns, RN

## 2019-09-10 NOTE — DISCHARGE SUMMARY
[facility]  DISCHARGE SUMMARY    Patient Name: Feliz Randhawa  : 1980  MRN: [id number]    Date of Admission: 2019  Date of Discharge:  9/10/2019  Primary Care Physician: Lloyd Bartlett MD    Consults     No orders found for last 30 day(s).          Hospital Course     Presenting Problem:   Hypokalemia [E87.6]    Active Hospital Problems    Diagnosis  POA   • **Hypertensive urgency [I16.0]  Yes   • Hypokalemia [E87.6]  Yes   • Musculoskeletal neck pain [M54.2]  Yes   • Bell's palsy [G51.0]  Yes      Resolved Hospital Problems   No resolved problems to display.          Hospital Course:  Feliz Randhawa is a 39 y.o. male with no prior PMH other than Bell's palsy presents with hypertensive urgency.  Pt was started on Norvasc in the ED and he was also given PRN Hydralazine. His BP has improved. I have increased his Amlodipine to 10mg and added Lisinopril 10mg.  He will need to see his PCP within one week to have a recheck BP and to have a BMP check to ensure his Creatinine is stable with addition of an ACEI.      Of note, I suspect he likely has CHARLOTTE given his history of inability to sleep more than a few hours without waking up gasping.  His wife reports that he also snores and that he has gained 10lbs in the last year.  He will need an outpatient sleep study.       Discharge Follow Up Recommendations for labs/diagnostics:  With PCP in one week with BMP at that time.     Day of Discharge     HPI:   Doing well this am, complains of headache from not being able to sleep (because he was moved from the ER to the [facility] and there were frequent interruptions). Tylenol has not alleviated his headache, willing to try Excedrin before he goes.  BP is much better this am.     Review of Systems  Gen- No fevers, chills  CV- No chest pain, palpitations  Resp- No cough, dyspnea  GI- No N/V/D, abd pain    All other systems reviewed and negative except any additional  pertinent positives and negatives discussed in HPI.     Vital Signs:   Temp:  [98.3 °F (36.8 °C)-98.9 °F (37.2 °C)] 98.3 °F (36.8 °C)  Heart Rate:  [] 83  Resp:  [18] 18  BP: (145-200)/() 165/105     Physical Exam:  Constitutional: No acute distress, awake, alert  HENT: NCAT, mucous membranes moist  Respiratory: Clear to auscultation bilaterally, respiratory effort normal   Cardiovascular: RRR, no murmurs, rubs, or gallops, palpable pedal pulses bilaterally  Gastrointestinal: Positive bowel sounds, soft, nontender, nondistended  Musculoskeletal: No bilateral ankle edema  Psychiatric: Appropriate affect, cooperative  Neurologic: Oriented x 3, strength symmetric in all extremities, Cranial Nerves grossly intact to confrontation, speech clear  Skin: No rashes    Pertinent  and/or Most Recent Results     Results from last 7 days   Lab Units 09/09/19 1953   WBC 10*3/mm3 8.64   HEMOGLOBIN g/dL 15.2   HEMATOCRIT % 44.0   PLATELETS 10*3/mm3 320   SODIUM mmol/L 142   POTASSIUM mmol/L 3.2*   CHLORIDE mmol/L 103   CO2 mmol/L 28.0   BUN mg/dL 13   CREATININE mg/dL 1.18   GLUCOSE mg/dL 95   CALCIUM mg/dL 9.3     Results from last 7 days   Lab Units 09/09/19 1953   BILIRUBIN mg/dL 0.3   ALK PHOS U/L 76   ALT (SGPT) U/L 50*   AST (SGOT) U/L 26           Invalid input(s): TG, LDLCALC, LDLREALC  Results from last 7 days   Lab Units 09/09/19 1953   TSH uIU/mL 0.530   PROBNP pg/mL 55.3   TROPONIN T ng/mL <0.010       Brief Urine Lab Results  (Last result in the past 365 days)      Color   Clarity   Blood   Leuk Est   Nitrite   Protein   CREAT   Urine HCG        09/09/19 2022 Yellow Clear Negative Negative Negative Negative               Microbiology Results Abnormal     None          Imaging Results (all)     Procedure Component Value Units Date/Time    CT Head Without Contrast [[id number]] Collected:  09/09/19 2118     Updated:  09/09/19 2120    Narrative:       CT Head WO    HISTORY:   39-year-old male in the ED  tonight with hypertensive urgency. Severe headache and near syncopal episode. Symptoms one month. History of Bell's palsy.    TECHNIQUE:   Axial unenhanced head CT. Radiation dose reduction techniques included automated exposure control or exposure modulation based on body size. Count of known CT and cardiac nuc med studies performed in previous 12 months: 0.     Time of scan: 2101 hours    COMPARISON:   None.    FINDINGS:   No intracranial hemorrhage, mass, or infarct. No hydrocephalus or extra-axial fluid collection. Brain parenchymal density is normal. The skull base, calvarium, and extracranial soft tissues are normal. Minimal chronic appearing ethmoid sinus disease.      Impression:       Normal, negative unenhanced head CT.          Signer Name: Sadiq Soares MD   Signed: 9/9/2019 9:18 PM   [facility]    Radiology [facility]    XR Chest 1 View [[id number]] Collected:  09/09/19 2022     Updated:  09/09/19 2024    Narrative:       CR Chest 1 Vw    INDICATION:   39-year-old male in ED tonight with shortness of air.     COMPARISON:    3/13/2015    FINDINGS:  Single portable AP view(s) of the chest.  The heart and mediastinal contours are normal. The lungs are clear. No pneumothorax or pleural effusion.      Impression:         1. Negative chest. No significant change.    Signer Name: Sadiq Soares MD   Signed: 9/9/2019 8:22 PM   Workstation Name: [facility]    Radiology [facility]    XR Spine Cervical 2 or 3 View [[id number]] Collected:  09/09/19 2020     Updated:  09/09/19 2023    Narrative:       CR Spine Cervical 2 or 3 Vws    INDICATION:   39-year-old male with neck pain 3 weeks. History of cerebral palsy.    COMPARISON:   None available.    FINDINGS:  4 views of the cervical spine. Dens and lateral masses intact. Alignment preserved. No acute fracture. Soft tissues within normal limits. No significant degenerative change.      Impression:       Negative  cervical spine.    Signer Name: Sadiq Soares MD   Signed: 9/9/2019 8:20 PM   Workstation Name: [facility]    [facility]                          Order Current Status    Basic Metabolic Panel In process    CBC Auto Differential In process    Hemoglobin A1c In process    Magnesium In process        Discharge Details        Discharge Medications      New Medications      Instructions Start Date   amLODIPine 10 MG tablet  Commonly known as:  NORVASC   10 mg, Oral, Every 24 Hours Scheduled   Start Date:  9/11/2019     lisinopril 10 MG tablet  Commonly known as:  PRINIVIL,ZESTRIL   10 mg, Oral, Every 24 Hours Scheduled   Start Date:  9/11/2019        Continue These Medications      Instructions Start Date   cyclobenzaprine 5 MG tablet  Commonly known as:  FLEXERIL   5 mg, Oral, Nightly PRN      naproxen 500 MG EC tablet  Commonly known as:  EC NAPROSYN   500 mg, Oral, 2 Times Daily With Meals         Stop These Medications    predniSONE 10 MG tablet  Commonly known as:  DELTASONE            Allergies   Allergen Reactions   • Gabapentin Rash         Discharge Disposition:      Diet:  Hospital:  Diet Order   Procedures   • Diet Regular     Discharge:     Discharge Activity:         CODE STATUS:    Code Status and Medical Interventions:   Ordered at: 09/10/19 0025     Code Status:    CPR     Medical Interventions (Level of Support Prior to Arrest):    Full         Future Appointments   Date Time Provider Department Center   10/10/2019  8:30 AM Camilla Durham APRN [facility] None           Time Spent on Discharge:  35 minutes    Electronically signed by Pilar Méndez MD, 09/10/19, 7:23 AM.

## 2019-09-10 NOTE — PLAN OF CARE
Problem: Patient Care Overview  Goal: Plan of Care Review  Outcome: Ongoing (interventions implemented as appropriate)   09/10/19 0543   Coping/Psychosocial   Plan of Care Reviewed With patient   Plan of Care Review   Progress no change   OTHER   Outcome Summary Pt arrived on unit from ED at 0535 with hypertension. /109 upon arrival. Will continue to monitor.      Goal: Individualization and Mutuality  Outcome: Ongoing (interventions implemented as appropriate)    Goal: Discharge Needs Assessment  Outcome: Ongoing (interventions implemented as appropriate)    Goal: Interprofessional Rounds/Family Conf  Outcome: Ongoing (interventions implemented as appropriate)

## 2019-09-10 NOTE — PLAN OF CARE
Problem: Patient Care Overview  Goal: Plan of Care Review   09/10/19 0957   Coping/Psychosocial   Plan of Care Reviewed With patient   Plan of Care Review   Progress improving

## 2019-09-10 NOTE — H&P
[facility]  HISTORY AND PHYSICAL    Patient Name: Feliz Randhawa  : 1980  MRN: [id number]  Primary Care Physician: Lloyd Bartlett MD  Date of admission: 2019      Subjective   Subjective     Chief Complaint:  headache    HPI:  Feliz Randhawa is a 39 y.o. male who presents w/ c/o headaches on and off for past 2 wks. Yesterday headache associated w/ nausea.  No f/c/cough/congestion/allergies.  No otc meds other than MVI, melatonin and tylenol.  No chest pain.  No blurry vision, no nu/ti.  No speech changes.  Hx of Bell's Palsy.  No focal weakness.  No other medical problems. No leg swelling.  Has headaches during intercourse also.  Pt states he had intercourse today and sx returned and were worse.  Pt also reports not sleeping well and wakes up after 2-3 hours of sleep and sometimes is gasping for air; wife reports snoring and friend states pt snores when he is awake.    Under lots of stress.  Wife massaged head/arms and felt better after this.    Review of Systems      All other systems reviewed and are negative.     Personal History     Past Medical History:   Diagnosis Date   • Bell's palsy    • Obesity        Past Surgical History:   Procedure Laterality Date   • NO PAST SURGERIES         Family History: family history includes Diabetes in his mother; Nephrolithiasis in his mother; No Known Problems in his father. Otherwise pertinent FHx was reviewed and unremarkable.     Social History:  reports that he has never smoked. He has never used smokeless tobacco. He reports that he does not drink alcohol or use drugs.  Social History     Social History Narrative   • Not on file       Medications:    Available home medication information reviewed.    (Not in a hospital admission)    Allergies   Allergen Reactions   • Gabapentin Rash       Objective   Objective     Vital Signs:   Temp:  [98.9 °F (37.2 °C)] 98.9 °F (37.2 °C)  Heart Rate:  [] 80  Resp:  [18]  18  BP: (170-200)/(117-134) 176/128        Physical Exam    gen; alert, oriented, nad  Heent; perrla, eomi, mmm  Cv; rrr, no mrg  L; ctab no wheeze/crackles  Abd; soft, +bs, ntnd  Ext; no cce, 2+ pulses  Skin; cdi, warm  Neuro; grossly intact  Psych; mood and affect appropriate    Results Reviewed:  I have personally reviewed current lab and radiology data.    Results from last 7 days   Lab Units 09/09/19 1953   WBC 10*3/mm3 8.64   HEMOGLOBIN g/dL 15.2   HEMATOCRIT % 44.0   PLATELETS 10*3/mm3 320     Results from last 7 days   Lab Units 09/09/19 1953   SODIUM mmol/L 142   POTASSIUM mmol/L 3.2*   CHLORIDE mmol/L 103   CO2 mmol/L 28.0   BUN mg/dL 13   CREATININE mg/dL 1.18   GLUCOSE mg/dL 95   CALCIUM mg/dL 9.3   ALT (SGPT) U/L 50*   AST (SGOT) U/L 26   TROPONIN T ng/mL <0.010   PROBNP pg/mL 55.3     Estimated Creatinine Clearance: 110.6 mL/min (by C-G formula based on SCr of 1.18 mg/dL).  Brief Urine Lab Results  (Last result in the past 365 days)      Color   Clarity   Blood   Leuk Est   Nitrite   Protein   CREAT   Urine HCG        09/09/19 2022 Yellow Clear Negative Negative Negative Negative             Imaging Results (last 24 hours)     Procedure Component Value Units Date/Time    CT Head Without Contrast [[id number]] Collected:  09/09/19 2118     Updated:  09/09/19 2120    Narrative:       CT Head WO    HISTORY:   39-year-old male in the ED tonight with hypertensive urgency. Severe headache and near syncopal episode. Symptoms one month. History of Bell's palsy.    TECHNIQUE:   Axial unenhanced head CT. Radiation dose reduction techniques included automated exposure control or exposure modulation based on body size. Count of known CT and cardiac nuc med studies performed in previous 12 months: 0.     Time of scan: 2101 hours    COMPARISON:   None.    FINDINGS:   No intracranial hemorrhage, mass, or infarct. No hydrocephalus or extra-axial fluid collection. Brain parenchymal density is normal. The skull base,  calvarium, and extracranial soft tissues are normal. Minimal chronic appearing ethmoid sinus disease.      Impression:       Normal, negative unenhanced head CT.          Signer Name: Sadiq Soares MD   Signed: 9/9/2019 9:18 PM   Workstation Name: [facility]    Radiology [facility]    XR Chest 1 View [[id number]] Collected:  09/09/19 2022     Updated:  09/09/19 2024    Narrative:       CR Chest 1 Vw    INDICATION:   39-year-old male in ED tonight with shortness of air.     COMPARISON:    3/13/2015    FINDINGS:  Single portable AP view(s) of the chest.  The heart and mediastinal contours are normal. The lungs are clear. No pneumothorax or pleural effusion.      Impression:         1. Negative chest. No significant change.    Signer Name: Sadiq Soares MD   Signed: 9/9/2019 8:22 PM   Workstation Name: [facility]    Radiology [facility]    XR Spine Cervical 2 or 3 View [[id number]] Collected:  09/09/19 2020     Updated:  09/09/19 2023    Narrative:       CR Spine Cervical 2 or 3 Vws    INDICATION:   39-year-old male with neck pain 3 weeks. History of cerebral palsy.    COMPARISON:   None available.    FINDINGS:  4 views of the cervical spine. Dens and lateral masses intact. Alignment preserved. No acute fracture. Soft tissues within normal limits. No significant degenerative change.      Impression:       Negative cervical spine.    Signer Name: Sadiq Soares MD   Signed: 9/9/2019 8:20 PM   Workstation Name: [facility]    Radiology [facility]             Assessment/Plan   Assessment / Plan     Active Hospital Problems    Diagnosis POA   • **Hypertensive urgency [I16.0] Yes   • Hypokalemia [E87.6] Yes   • Musculoskeletal neck pain [M54.2] Yes   • Bell's palsy [G51.0] Yes     38 y/o otherwise healthy male other than hx of Bell's palsy here w/:  1. Hypertensive urgency; norvasc initiated in ED.  Will add prn iv hydralazine.  Feel that his htn likely also related to insomnia  and probable CHARLOTTE as he reports not being able to sleep more than a few hours w/o waking up gasping.  Wife reports snoring as well.  Has gained 10 lbs this year.  Work also stressful.    2. Hypokalemia; replaced in ER; will repeat level  3. ?CHARLOTTE: will need sleep study as outpt as d/w pt.     DVT prophylaxis:  scd's     CODE STATUS:    There are no questions and answers to display.       Admission Status:  I believe this patient meets  INPATIENT status.  I feel patient’s risk for adverse outcomes and need for care warrant INPATIENT evaluation and I predict the patient’s care encounter to likely last beyond 2 midnights.      Electronically signed by Evita Cabello MD, 09/09/19, 11:59 PM.

## 2019-09-11 ENCOUNTER — OFFICE VISIT (OUTPATIENT)
Dept: FAMILY MEDICINE CLINIC | Facility: CLINIC | Age: 39
End: 2019-09-11

## 2019-09-11 ENCOUNTER — CONSULT (OUTPATIENT)
Dept: SLEEP MEDICINE | Facility: HOSPITAL | Age: 39
End: 2019-09-11

## 2019-09-11 ENCOUNTER — TRANSITIONAL CARE MANAGEMENT TELEPHONE ENCOUNTER (OUTPATIENT)
Dept: FAMILY MEDICINE CLINIC | Facility: CLINIC | Age: 39
End: 2019-09-11

## 2019-09-11 VITALS
TEMPERATURE: 98.2 F | WEIGHT: 204.4 LBS | RESPIRATION RATE: 19 BRPM | SYSTOLIC BLOOD PRESSURE: 171 MMHG | BODY MASS INDEX: 32.85 KG/M2 | HEIGHT: 66 IN | DIASTOLIC BLOOD PRESSURE: 110 MMHG | HEART RATE: 115 BPM | OXYGEN SATURATION: 97 %

## 2019-09-11 VITALS
WEIGHT: 203.2 LBS | SYSTOLIC BLOOD PRESSURE: 175 MMHG | DIASTOLIC BLOOD PRESSURE: 112 MMHG | HEART RATE: 93 BPM | OXYGEN SATURATION: 97 % | HEIGHT: 66 IN | BODY MASS INDEX: 32.66 KG/M2

## 2019-09-11 DIAGNOSIS — R06.83 SNORING: Primary | ICD-10-CM

## 2019-09-11 DIAGNOSIS — G47.33 OBSTRUCTIVE SLEEP APNEA, ADULT: ICD-10-CM

## 2019-09-11 DIAGNOSIS — E66.09 CLASS 1 OBESITY DUE TO EXCESS CALORIES WITHOUT SERIOUS COMORBIDITY WITH BODY MASS INDEX (BMI) OF 32.0 TO 32.9 IN ADULT: ICD-10-CM

## 2019-09-11 DIAGNOSIS — I10 UNCONTROLLED HYPERTENSION: Primary | ICD-10-CM

## 2019-09-11 PROCEDURE — 99213 OFFICE O/P EST LOW 20 MIN: CPT | Performed by: NURSE PRACTITIONER

## 2019-09-11 PROCEDURE — 99203 OFFICE O/P NEW LOW 30 MIN: CPT | Performed by: INTERNAL MEDICINE

## 2019-09-11 RX ORDER — AMLODIPINE BESYLATE 10 MG/1
10 TABLET ORAL
Qty: 30 TABLET | Refills: 0 | Status: SHIPPED | OUTPATIENT
Start: 2019-09-11 | End: 2019-09-16 | Stop reason: SDUPTHER

## 2019-09-11 RX ORDER — NEBIVOLOL 5 MG/1
5 TABLET ORAL DAILY
Qty: 30 TABLET | Refills: 0 | COMMUNITY
Start: 2019-09-11 | End: 2019-09-16 | Stop reason: SDUPTHER

## 2019-09-11 RX ORDER — LISINOPRIL 10 MG/1
10 TABLET ORAL 2 TIMES DAILY
Qty: 60 TABLET | Refills: 0 | Status: SHIPPED | OUTPATIENT
Start: 2019-09-11 | End: 2019-09-16 | Stop reason: SDUPTHER

## 2019-09-11 NOTE — OUTREACH NOTE
"Called pt for TCM following hospitalization for hypertensive urgency. He states he \"feels calli better\". He said he took his bp meds at 8:30am and checked his bp, 176/107. At Dr Olivas's appt this am his bp was 175/112. It is now 1:30pm. He is at an apartment working. He denies headache, visual disturbances, lightheadedness, dizziness, n/v, sob or chest pain. I advised him to go back to his apartment to check his bp and call me back with reading. He verb understanding and said he will now. I advised him if bp is still elevated he may need to seek care today and he verb understanding.    Pt called back with bp of 175/103. I advised him to report to the office for bp check since they have walk in hours and he verb understanding and said he will now.  "

## 2019-09-11 NOTE — PATIENT INSTRUCTIONS
"Hypertension  Hypertension, commonly called high blood pressure, is when the force of blood pumping through the arteries is too strong. The arteries are the blood vessels that carry blood from the heart throughout the body. Hypertension forces the heart to work harder to pump blood and may cause arteries to become narrow or stiff. Having untreated or uncontrolled hypertension can cause heart attacks, strokes, kidney disease, and other problems.  A blood pressure reading consists of a higher number over a lower number. Ideally, your blood pressure should be below 120/80. The first (\"top\") number is called the systolic pressure. It is a measure of the pressure in your arteries as your heart beats. The second (\"bottom\") number is called the diastolic pressure. It is a measure of the pressure in your arteries as the heart relaxes.  What are the causes?  The cause of this condition is not known.  What increases the risk?  Some risk factors for high blood pressure are under your control. Others are not.  Factors you can change  · Smoking.  · Having type 2 diabetes mellitus, high cholesterol, or both.  · Not getting enough exercise or physical activity.  · Being overweight.  · Having too much fat, sugar, calories, or salt (sodium) in your diet.  · Drinking too much alcohol.  Factors that are difficult or impossible to change  · Having chronic kidney disease.  · Having a family history of high blood pressure.  · Age. Risk increases with age.  · Race. You may be at higher risk if you are -American.  · Gender. Men are at higher risk than women before age 45. After age 65, women are at higher risk than men.  · Having obstructive sleep apnea.  · Stress.  What are the signs or symptoms?  Extremely high blood pressure (hypertensive crisis) may cause:  · Headache.  · Anxiety.  · Shortness of breath.  · Nosebleed.  · Nausea and vomiting.  · Severe chest pain.  · Jerky movements you cannot control (seizures).  How is this " diagnosed?  This condition is diagnosed by measuring your blood pressure while you are seated, with your arm resting on a surface. The cuff of the blood pressure monitor will be placed directly against the skin of your upper arm at the level of your heart. It should be measured at least twice using the same arm. Certain conditions can cause a difference in blood pressure between your right and left arms.  Certain factors can cause blood pressure readings to be lower or higher than normal (elevated) for a short period of time:  · When your blood pressure is higher when you are in a health care provider's office than when you are at home, this is called white coat hypertension. Most people with this condition do not need medicines.  · When your blood pressure is higher at home than when you are in a health care provider's office, this is called masked hypertension. Most people with this condition may need medicines to control blood pressure.  If you have a high blood pressure reading during one visit or you have normal blood pressure with other risk factors:  · You may be asked to return on a different day to have your blood pressure checked again.  · You may be asked to monitor your blood pressure at home for 1 week or longer.  If you are diagnosed with hypertension, you may have other blood or imaging tests to help your health care provider understand your overall risk for other conditions.  How is this treated?  This condition is treated by making healthy lifestyle changes, such as eating healthy foods, exercising more, and reducing your alcohol intake. Your health care provider may prescribe medicine if lifestyle changes are not enough to get your blood pressure under control, and if:  · Your systolic blood pressure is above 130.  · Your diastolic blood pressure is above 80.  Your personal target blood pressure may vary depending on your medical conditions, your age, and other factors.  Follow these instructions  at home:  Eating and drinking    · Eat a diet that is high in fiber and potassium, and low in sodium, added sugar, and fat. An example eating plan is called the DASH (Dietary Approaches to Stop Hypertension) diet. To eat this way:  ? Eat plenty of fresh fruits and vegetables. Try to fill half of your plate at each meal with fruits and vegetables.  ? Eat whole grains, such as whole wheat pasta, brown rice, or whole grain bread. Fill about one quarter of your plate with whole grains.  ? Eat or drink low-fat dairy products, such as skim milk or low-fat yogurt.  ? Avoid fatty cuts of meat, processed or cured meats, and poultry with skin. Fill about one quarter of your plate with lean proteins, such as fish, chicken without skin, beans, eggs, and tofu.  ? Avoid premade and processed foods. These tend to be higher in sodium, added sugar, and fat.  · Reduce your daily sodium intake. Most people with hypertension should eat less than 1,500 mg of sodium a day.  · Limit alcohol intake to no more than 1 drink a day for nonpregnant women and 2 drinks a day for men. One drink equals 12 oz of beer, 5 oz of wine, or 1½ oz of hard liquor.  Lifestyle    · Work with your health care provider to maintain a healthy body weight or to lose weight. Ask what an ideal weight is for you.  · Get at least 30 minutes of exercise that causes your heart to beat faster (aerobic exercise) most days of the week. Activities may include walking, swimming, or biking.  · Include exercise to strengthen your muscles (resistance exercise), such as pilates or lifting weights, as part of your weekly exercise routine. Try to do these types of exercises for 30 minutes at least 3 days a week.  · Do not use any products that contain nicotine or tobacco, such as cigarettes and e-cigarettes. If you need help quitting, ask your health care provider.  · Monitor your blood pressure at home as told by your health care provider.  · Keep all follow-up visits as told by  your health care provider. This is important.  Medicines  · Take over-the-counter and prescription medicines only as told by your health care provider. Follow directions carefully. Blood pressure medicines must be taken as prescribed.  · Do not skip doses of blood pressure medicine. Doing this puts you at risk for problems and can make the medicine less effective.  · Ask your health care provider about side effects or reactions to medicines that you should watch for.  Contact a health care provider if:  · You think you are having a reaction to a medicine you are taking.  · You have headaches that keep coming back (recurring).  · You feel dizzy.  · You have swelling in your ankles.  · You have trouble with your vision.  Get help right away if:  · You develop a severe headache or confusion.  · You have unusual weakness or numbness.  · You feel faint.  · You have severe pain in your chest or abdomen.  · You vomit repeatedly.  · You have trouble breathing.  Summary  · Hypertension is when the force of blood pumping through your arteries is too strong. If this condition is not controlled, it may put you at risk for serious complications.  · Your personal target blood pressure may vary depending on your medical conditions, your age, and other factors. For most people, a normal blood pressure is less than 120/80.  · Hypertension is treated with lifestyle changes, medicines, or a combination of both. Lifestyle changes include weight loss, eating a healthy, low-sodium diet, exercising more, and limiting alcohol.  This information is not intended to replace advice given to you by your health care provider. Make sure you discuss any questions you have with your health care provider.  Document Released: 12/18/2006 Document Revised: 11/15/2017 Document Reviewed: 11/15/2017  [employer] Interactive Patient Education © 2019 [employer]    Managing Your Hypertension  Hypertension is commonly called high blood pressure. This is when  "the force of your blood pressing against the walls of your arteries is too strong. Arteries are blood vessels that carry blood from your heart throughout your body. Hypertension forces the heart to work harder to pump blood, and may cause the arteries to become narrow or stiff. Having untreated or uncontrolled hypertension can cause heart attack, stroke, kidney disease, and other problems.  What are blood pressure readings?  A blood pressure reading consists of a higher number over a lower number. Ideally, your blood pressure should be below 120/80. The first (\"top\") number is called the systolic pressure. It is a measure of the pressure in your arteries as your heart beats. The second (\"bottom\") number is called the diastolic pressure. It is a measure of the pressure in your arteries as the heart relaxes.  What does my blood pressure reading mean?  Blood pressure is classified into four stages. Based on your blood pressure reading, your health care provider may use the following stages to determine what type of treatment you need, if any. Systolic pressure and diastolic pressure are measured in a unit called mm Hg.  Normal  · Systolic pressure: below 120.  · Diastolic pressure: below 80.  Elevated  · Systolic pressure: 120-129.  · Diastolic pressure: below 80.  Hypertension stage 1  · Systolic pressure: 130-139.  · Diastolic pressure: 80-89.  Hypertension stage 2  · Systolic pressure: 140 or above.  · Diastolic pressure: 90 or above.  What health risks are associated with hypertension?  Managing your hypertension is an important responsibility. Uncontrolled hypertension can lead to:  · A heart attack.  · A stroke.  · A weakened blood vessel (aneurysm).  · Heart failure.  · Kidney damage.  · Eye damage.  · Metabolic syndrome.  · Memory and concentration problems.  What changes can I make to manage my hypertension?  Hypertension can be managed by making lifestyle changes and possibly by taking medicines. Your health " care provider will help you make a plan to bring your blood pressure within a normal range.  Eating and drinking    · Eat a diet that is high in fiber and potassium, and low in salt (sodium), added sugar, and fat. An example eating plan is called the DASH (Dietary Approaches to Stop Hypertension) diet. To eat this way:  ? Eat plenty of fresh fruits and vegetables. Try to fill half of your plate at each meal with fruits and vegetables.  ? Eat whole grains, such as whole wheat pasta, brown rice, or whole grain bread. Fill about one quarter of your plate with whole grains.  ? Eat low-fat diary products.  ? Avoid fatty cuts of meat, processed or cured meats, and poultry with skin. Fill about one quarter of your plate with lean proteins such as fish, chicken without skin, beans, eggs, and tofu.  ? Avoid premade and processed foods. These tend to be higher in sodium, added sugar, and fat.  · Reduce your daily sodium intake. Most people with hypertension should eat less than 1,500 mg of sodium a day.  · Limit alcohol intake to no more than 1 drink a day for nonpregnant women and 2 drinks a day for men. One drink equals 12 oz of beer, 5 oz of wine, or 1½ oz of hard liquor.  Lifestyle  · Work with your health care provider to maintain a healthy body weight, or to lose weight. Ask what an ideal weight is for you.  · Get at least 30 minutes of exercise that causes your heart to beat faster (aerobic exercise) most days of the week. Activities may include walking, swimming, or biking.  · Include exercise to strengthen your muscles (resistance exercise), such as weight lifting, as part of your weekly exercise routine. Try to do these types of exercises for 30 minutes at least 3 days a week.  · Do not use any products that contain nicotine or tobacco, such as cigarettes and e-cigarettes. If you need help quitting, ask your health care provider.  · Control any long-term (chronic) conditions you have, such as high cholesterol or  diabetes.  Monitoring  · Monitor your blood pressure at home as told by your health care provider. Your personal target blood pressure may vary depending on your medical conditions, your age, and other factors.  · Have your blood pressure checked regularly, as often as told by your health care provider.  Working with your health care provider  · Review all the medicines you take with your health care provider because there may be side effects or interactions.  · Talk with your health care provider about your diet, exercise habits, and other lifestyle factors that may be contributing to hypertension.  · Visit your health care provider regularly. Your health care provider can help you create and adjust your plan for managing hypertension.  Will I need medicine to control my blood pressure?  Your health care provider may prescribe medicine if lifestyle changes are not enough to get your blood pressure under control, and if:  · Your systolic blood pressure is 130 or higher.  · Your diastolic blood pressure is 80 or higher.  Take medicines only as told by your health care provider. Follow the directions carefully. Blood pressure medicines must be taken as prescribed. The medicine does not work as well when you skip doses. Skipping doses also puts you at risk for problems.  Contact a health care provider if:  · You think you are having a reaction to medicines you have taken.  · You have repeated (recurrent) headaches.  · You feel dizzy.  · You have swelling in your ankles.  · You have trouble with your vision.  Get help right away if:  · You develop a severe headache or confusion.  · You have unusual weakness or numbness, or you feel faint.  · You have severe pain in your chest or abdomen.  · You vomit repeatedly.  · You have trouble breathing.  Summary  · Hypertension is when the force of blood pumping through your arteries is too strong. If this condition is not controlled, it may put you at risk for serious  "complications.  · Your personal target blood pressure may vary depending on your medical conditions, your age, and other factors. For most people, a normal blood pressure is less than 120/80.  · Hypertension is managed by lifestyle changes, medicines, or both. Lifestyle changes include weight loss, eating a healthy, low-sodium diet, exercising more, and limiting alcohol.  This information is not intended to replace advice given to you by your health care provider. Make sure you discuss any questions you have with your health care provider.  Document Released: 09/11/2013 Document Revised: 11/15/2017 Document Reviewed: 11/15/2017  [employer] Interactive Patient Education © 2019 [employer]    DASH Eating Plan  DASH stands for \"Dietary Approaches to Stop Hypertension.\" The DASH eating plan is a healthy eating plan that has been shown to reduce high blood pressure (hypertension). It may also reduce your risk for type 2 diabetes, heart disease, and stroke. The DASH eating plan may also help with weight loss.  What are tips for following this plan?    General guidelines  · Avoid eating more than 2,300 mg (milligrams) of salt (sodium) a day. If you have hypertension, you may need to reduce your sodium intake to 1,500 mg a day.  · Limit alcohol intake to no more than 1 drink a day for nonpregnant women and 2 drinks a day for men. One drink equals 12 oz of beer, 5 oz of wine, or 1½ oz of hard liquor.  · Work with your health care provider to maintain a healthy body weight or to lose weight. Ask what an ideal weight is for you.  · Get at least 30 minutes of exercise that causes your heart to beat faster (aerobic exercise) most days of the week. Activities may include walking, swimming, or biking.  · Work with your health care provider or diet and nutrition specialist (dietitian) to adjust your eating plan to your individual calorie needs.  Reading food labels    · Check food labels for the amount of sodium per serving. Choose " "foods with less than 5 percent of the Daily Value of sodium. Generally, foods with less than 300 mg of sodium per serving fit into this eating plan.  · To find whole grains, look for the word \"whole\" as the first word in the ingredient list.  Shopping  · Buy products labeled as \"low-sodium\" or \"no salt added.\"  · Buy fresh foods. Avoid canned foods and premade or frozen meals.  Cooking  · Avoid adding salt when cooking. Use salt-free seasonings or herbs instead of table salt or sea salt. Check with your health care provider or pharmacist before using salt substitutes.  · Do not soto foods. Cook foods using healthy methods such as baking, boiling, grilling, and broiling instead.  · Cook with heart-healthy oils, such as olive, canola, soybean, or sunflower oil.  Meal planning  · Eat a balanced diet that includes:  ? 5 or more servings of fruits and vegetables each day. At each meal, try to fill half of your plate with fruits and vegetables.  ? Up to 6-8 servings of whole grains each day.  ? Less than 6 oz of lean meat, poultry, or fish each day. A 3-oz serving of meat is about the same size as a deck of cards. One egg equals 1 oz.  ? 2 servings of low-fat dairy each day.  ? A serving of nuts, seeds, or beans 5 times each week.  ? Heart-healthy fats. Healthy fats called Omega-3 fatty acids are found in foods such as flaxseeds and coldwater fish, like sardines, salmon, and mackerel.  · Limit how much you eat of the following:  ? Canned or prepackaged foods.  ? Food that is high in trans fat, such as fried foods.  ? Food that is high in saturated fat, such as fatty meat.  ? Sweets, desserts, sugary drinks, and other foods with added sugar.  ? Full-fat dairy products.  · Do not salt foods before eating.  · Try to eat at least 2 vegetarian meals each week.  · Eat more home-cooked food and less restaurant, buffet, and fast food.  · When eating at a restaurant, ask that your food be prepared with less salt or no salt, if " possible.  What foods are recommended?  The items listed may not be a complete list. Talk with your dietitian about what dietary choices are best for you.  Grains  Whole-grain or whole-wheat bread. Whole-grain or whole-wheat pasta. Brown rice. Oatmeal. Quinoa. Bulgur. Whole-grain and low-sodium cereals. Alla bread. Low-fat, low-sodium crackers. Whole-wheat flour tortillas.  Vegetables  Fresh or frozen vegetables (raw, steamed, roasted, or grilled). Low-sodium or reduced-sodium tomato and vegetable juice. Low-sodium or reduced-sodium tomato sauce and tomato paste. Low-sodium or reduced-sodium canned vegetables.  Fruits  All fresh, dried, or frozen fruit. Canned fruit in natural juice (without added sugar).  Meat and other protein foods  Skinless chicken or turkey. Ground chicken or turkey. Pork with fat trimmed off. Fish and seafood. Egg whites. Dried beans, peas, or lentils. Unsalted nuts, nut butters, and seeds. Unsalted canned beans. Lean cuts of beef with fat trimmed off. Low-sodium, lean deli meat.  Dairy  Low-fat (1%) or fat-free (skim) milk. Fat-free, low-fat, or reduced-fat cheeses. Nonfat, low-sodium ricotta or cottage cheese. Low-fat or nonfat yogurt. Low-fat, low-sodium cheese.  Fats and oils  Soft margarine without trans fats. Vegetable oil. Low-fat, reduced-fat, or light mayonnaise and salad dressings (reduced-sodium). Canola, safflower, olive, soybean, and sunflower oils. Avocado.  Seasoning and other foods  Herbs. Spices. Seasoning mixes without salt. Unsalted popcorn and pretzels. Fat-free sweets.  What foods are not recommended?  The items listed may not be a complete list. Talk with your dietitian about what dietary choices are best for you.  Grains  Baked goods made with fat, such as croissants, muffins, or some breads. Dry pasta or rice meal packs.  Vegetables  Creamed or fried vegetables. Vegetables in a cheese sauce. Regular canned vegetables (not low-sodium or reduced-sodium). Regular canned  tomato sauce and paste (not low-sodium or reduced-sodium). Regular tomato and vegetable juice (not low-sodium or reduced-sodium). Pickles. Olives.  Fruits  Canned fruit in a light or heavy syrup. Fried fruit. Fruit in cream or butter sauce.  Meat and other protein foods  Fatty cuts of meat. Ribs. Fried meat. Ling. Sausage. Bologna and other processed lunch meats. Salami. Fatback. Hotdogs. Bratwurst. Salted nuts and seeds. Canned beans with added salt. Canned or smoked fish. Whole eggs or egg yolks. Chicken or turkey with skin.  Dairy  Whole or 2% milk, cream, and half-and-half. Whole or full-fat cream cheese. Whole-fat or sweetened yogurt. Full-fat cheese. Nondairy creamers. Whipped toppings. Processed cheese and cheese spreads.  Fats and oils  Butter. Stick margarine. Lard. Shortening. Ghee. Ling fat. Tropical oils, such as coconut, palm kernel, or palm oil.  Seasoning and other foods  Salted popcorn and pretzels. Onion salt, garlic salt, seasoned salt, table salt, and sea salt. Worcestershire sauce. Tartar sauce. Barbecue sauce. Teriyaki sauce. Soy sauce, including reduced-sodium. Steak sauce. Canned and packaged gravies. Fish sauce. Oyster sauce. Cocktail sauce. Horseradish that you find on the shelf. Ketchup. Mustard. Meat flavorings and tenderizers. Bouillon cubes. Hot sauce and Tabasco sauce. Premade or packaged marinades. Premade or packaged taco seasonings. Relishes. Regular salad dressings.  Where to find more information:  · National [employer], [employer] [place]: www.nhlbi.nih.gov  · American Heart Association: www.heart.org  Summary  · The DASH eating plan is a healthy eating plan that has been shown to reduce high blood pressure (hypertension). It may also reduce your risk for type 2 diabetes, heart disease, and stroke.  · With the DASH eating plan, you should limit salt (sodium) intake to 2,300 mg a day. If you have hypertension, you may need to reduce your sodium intake to 1,500 mg a day.  · When  on the DASH eating plan, aim to eat more fresh fruits and vegetables, whole grains, lean proteins, low-fat dairy, and heart-healthy fats.  · Work with your health care provider or diet and nutrition specialist (dietitian) to adjust your eating plan to your individual calorie needs.  This information is not intended to replace advice given to you by your health care provider. Make sure you discuss any questions you have with your health care provider.  Document Released: 12/06/2012 Document Revised: 12/11/2017 Document Reviewed: 12/11/2017  [employer] Interactive Patient Education © 2019 [employer]  Nebivolol oral tablets  What is this medicine?  NEBIVOLOL (ne BIV oh lol) is a beta-blocker. Beta-blockers reduce the workload on the heart and help it to beat more regularly. This medicine is used to treat high blood pressure.  This medicine may be used for other purposes; ask your health care provider or pharmacist if you have questions.  COMMON BRAND NAME(S): Bystolic  What should I tell my health care provider before I take this medicine?  They need to know if you have any of these conditions:  -diabetes  -heart or vessel disease like slow heartrate, worsening heart failure, heart block, sick sinus syndrome or Raynaud's disease  -kidney disease  -liver disease  -lung disease like asthma or emphysema  -pheochromocytoma  -thyroid disease  -an unusual or allergic reaction to nebivolol, other beta-blockers, medicines, foods, dyes, or preservatives  -pregnant or trying to get pregnant  -breast-feeding  How should I use this medicine?  Take this medicine by mouth with a glass of water. Follow the directions on the prescription label. You can take this medicine with or without food. Take your doses at regular intervals. Do not take your medicine more often than directed. Do not stop taking this medicine suddenly. This could lead to serious heart-related effects.  Talk to your pediatrician regarding the use of this medicine  in children. Special care may be needed.  Overdosage: If you think you have taken too much of this medicine contact a poison control center or emergency room at once.  NOTE: This medicine is only for you. Do not share this medicine with others.  What if I miss a dose?  If you miss a dose, take it as soon as you can. If it is almost time for your next dose, take only that dose. Do not take double or extra doses.  What may interact with this medicine?  This medicine may interact with the following medications:  -certain medicines for blood pressure, heart disease, irregular heart beat  -certain medicines for depression, like fluoxetine or paroxetine  -cimetidine  -clonidine  -reserpine  -sildenafil  This list may not describe all possible interactions. Give your health care provider a list of all the medicines, herbs, non-prescription drugs, or dietary supplements you use. Also tell them if you smoke, drink alcohol, or use illegal drugs. Some items may interact with your medicine.  What should I watch for while using this medicine?  Visit your doctor or health care professional for regular checks on your progress. Check your heart rate and blood pressure regularly while you are taking this medicine. Ask your doctor or health care professional what your heart rate and blood pressure should be, and when you should contact him or her.  You may get drowsy or dizzy. Do not drive, use machinery, or do anything that needs mental alertness until you know how this drug affects you. Do not stand or sit up quickly, especially if you are an older patient. This reduces the risk of dizzy or fainting spells. Alcohol can make you more drowsy and dizzy. Avoid alcoholic drinks.  This medicine can affect blood sugar levels. If you have diabetes, check with your doctor or health care professional before you change your diet or the dose of your diabetic medicine.  Do not treat yourself for coughs, colds, or pain while you are taking this  medicine without asking your doctor or health care professional for advice. Some ingredients may increase your blood pressure.  What side effects may I notice from receiving this medicine?  Side effects that you should report to your doctor or health care professional as soon as possible:  -allergic reactions like skin rash, itching or hives, swelling of the face, lips, or tongue  -breathing problems  -chest pain  -cold, tingling, or numb hands or feet  -dark urine  -general ill feeling or flu-like symptoms  -irregular heartbeat  -light-colored stools  -loss of appetite, nausea  -right upper belly pain  -slow heart rate  -swollen legs or ankles  -unusual bleeding or bruising  -unusually weak or tired  -vomiting  -yellowing of the eyes or skin  Side effects that usually do not require medical attention (report to your doctor or health care professional if they continue or are bothersome):  -diarrhea  -dizziness  -dry or burning eyes  -headache  -nausea  -tiredness  -trouble sleeping  This list may not describe all possible side effects. Call your doctor for medical advice about side effects. You may report side effects to FDA at 6-815-FDA-7136.  Where should I keep my medicine?  Keep out of the reach of children.  Store at room temperature between 20 and 25 degrees C (68 and 77 degrees F). Protect from light. Keep container tightly closed. Throw away any unused medicine after the expiration date.  NOTE: This sheet is a summary. It may not cover all possible information. If you have questions about this medicine, talk to your doctor, pharmacist, or health care provider.  © 2019 [employer] Standard (2014-08-22 14:46:00)  Amlodipine tablets  What is this medicine?  AMLODIPINE (am KRISTY di peen) is a calcium-channel blocker. It affects the amount of calcium found in your heart and muscle cells. This relaxes your blood vessels, which can reduce the amount of work the heart has to do. This medicine is used to lower high  blood pressure. It is also used to prevent chest pain.  This medicine may be used for other purposes; ask your health care provider or pharmacist if you have questions.  COMMON BRAND NAME(S): Norvasc  What should I tell my health care provider before I take this medicine?  They need to know if you have any of these conditions:  -heart problems like heart failure or aortic stenosis  -liver disease  -an unusual or allergic reaction to amlodipine, other medicines, foods, dyes, or preservatives  -pregnant or trying to get pregnant  -breast-feeding  How should I use this medicine?  Take this medicine by mouth with a glass of water. Follow the directions on the prescription label. Take your medicine at regular intervals. Do not take more medicine than directed.  Talk to your pediatrician regarding the use of this medicine in children. Special care may be needed. This medicine has been used in children as young as 6.  Persons over 65 years old may have a stronger reaction to this medicine and need smaller doses.  Overdosage: If you think you have taken too much of this medicine contact a poison control center or emergency room at once.  NOTE: This medicine is only for you. Do not share this medicine with others.  What if I miss a dose?  If you miss a dose, take it as soon as you can. If it is almost time for your next dose, take only that dose. Do not take double or extra doses.  What may interact with this medicine?  -herbal or dietary supplements  -local or general anesthetics  -medicines for high blood pressure  -medicines for prostate problems  -rifampin  This list may not describe all possible interactions. Give your health care provider a list of all the medicines, herbs, non-prescription drugs, or dietary supplements you use. Also tell them if you smoke, drink alcohol, or use illegal drugs. Some items may interact with your medicine.  What should I watch for while using this medicine?  Visit your doctor or health  care professional for regular check ups. Check your blood pressure and pulse rate regularly. Ask your health care professional what your blood pressure and pulse rate should be, and when you should contact him or her.  This medicine may make you feel confused, dizzy or lightheaded. Do not drive, use machinery, or do anything that needs mental alertness until you know how this medicine affects you. To reduce the risk of dizzy or fainting spells, do not sit or stand up quickly, especially if you are an older patient. Avoid alcoholic drinks; they can make you more dizzy.  Do not suddenly stop taking amlodipine. Ask your doctor or health care professional how you can gradually reduce the dose.  What side effects may I notice from receiving this medicine?  Side effects that you should report to your doctor or health care professional as soon as possible:  -allergic reactions like skin rash, itching or hives, swelling of the face, lips, or tongue  -breathing problems  -changes in vision or hearing  -chest pain  -fast, irregular heartbeat  -swelling of legs or ankles  Side effects that usually do not require medical attention (report to your doctor or health care professional if they continue or are bothersome):  -dry mouth  -facial flushing  -nausea, vomiting  -stomach gas, pain  -tired, weak  -trouble sleeping  This list may not describe all possible side effects. Call your doctor for medical advice about side effects. You may report side effects to FDA at 5-099-FDA-5633.  Where should I keep my medicine?  Keep out of the reach of children.  Store at room temperature between 59 and 86 degrees F (15 and 30 degrees C). Protect from light. Keep container tightly closed. Throw away any unused medicine after the expiration date.  NOTE: This sheet is a summary. It may not cover all possible information. If you have questions about this medicine, talk to your doctor, pharmacist, or health care provider.  © 2019 [employer]  Standard (2013-11-15 11:40:58)  Lisinopril tablets  What is this medicine?  LISINOPRIL (lyse IN oh pril) is an ACE inhibitor. This medicine is used to treat high blood pressure and heart failure. It is also used to protect the heart immediately after a heart attack.  This medicine may be used for other purposes; ask your health care provider or pharmacist if you have questions.  COMMON BRAND NAME(S): Prinivil, Zestril  What should I tell my health care provider before I take this medicine?  They need to know if you have any of these conditions:  -diabetes  -heart or blood vessel disease  -kidney disease  -low blood pressure  -previous swelling of the tongue, face, or lips with difficulty breathing, difficulty swallowing, hoarseness, or tightening of the throat  -an unusual or allergic reaction to lisinopril, other ACE inhibitors, insect venom, foods, dyes, or preservatives  -pregnant or trying to get pregnant  -breast-feeding  How should I use this medicine?  Take this medicine by mouth with a glass of water. Follow the directions on your prescription label. You may take this medicine with or without food. If it upsets your stomach, take it with food. Take your medicine at regular intervals. Do not take it more often than directed. Do not stop taking except on your doctor's advice.  Talk to your pediatrician regarding the use of this medicine in children. Special care may be needed. While this drug may be prescribed for children as young as 6 years of age for selected conditions, precautions do apply.  Overdosage: If you think you have taken too much of this medicine contact a poison control center or emergency room at once.  NOTE: This medicine is only for you. Do not share this medicine with others.  What if I miss a dose?  If you miss a dose, take it as soon as you can. If it is almost time for your next dose, take only that dose. Do not take double or extra doses.  What may interact with this medicine?  Do not  take this medicine with any of the following medications:  -hymenoptera venom  -sacubitril; valsartan  This medicines may also interact with the following medications:  -aliskiren  -angiotensin receptor blockers, like losartan or valsartan  -certain medicines for diabetes  -diuretics  -everolimus  -gold compounds  -lithium  -NSAIDs, medicines for pain and inflammation, like ibuprofen or naproxen  -potassium salts or supplements  -salt substitutes  -sirolimus  -temsirolimus  This list may not describe all possible interactions. Give your health care provider a list of all the medicines, herbs, non-prescription drugs, or dietary supplements you use. Also tell them if you smoke, drink alcohol, or use illegal drugs. Some items may interact with your medicine.  What should I watch for while using this medicine?  Visit your doctor or health care professional for regular check ups. Check your blood pressure as directed. Ask your doctor what your blood pressure should be, and when you should contact him or her.  Do not treat yourself for coughs, colds, or pain while you are using this medicine without asking your doctor or health care professional for advice. Some ingredients may increase your blood pressure.  Women should inform their doctor if they wish to become pregnant or think they might be pregnant. There is a potential for serious side effects to an unborn child. Talk to your health care professional or pharmacist for more information.  Check with your doctor or health care professional if you get an attack of severe diarrhea, nausea and vomiting, or if you sweat a lot. The loss of too much body fluid can make it dangerous for you to take this medicine.  You may get drowsy or dizzy. Do not drive, use machinery, or do anything that needs mental alertness until you know how this drug affects you. Do not stand or sit up quickly, especially if you are an older patient. This reduces the risk of dizzy or fainting spells.  Alcohol can make you more drowsy and dizzy. Avoid alcoholic drinks.  Avoid salt substitutes unless you are told otherwise by your doctor or health care professional.  What side effects may I notice from receiving this medicine?  Side effects that you should report to your doctor or health care professional as soon as possible:  -allergic reactions like skin rash, itching or hives, swelling of the hands, feet, face, lips, throat, or tongue  -breathing problems  -signs and symptoms of kidney injury like trouble passing urine or change in the amount of urine  -signs and symptoms of increased potassium like muscle weakness; chest pain; or fast, irregular heartbeat  -signs and symptoms of liver injury like dark yellow or brown urine; general ill feeling or flu-like symptoms; light-colored stools; loss of appetite; nausea; right upper belly pain; unusually weak or tired; yellowing of the eyes or skin  -signs and symptoms of low blood pressure like dizziness; feeling faint or lightheaded, falls; unusually weak or tired  -stomach pain with or without nausea and vomiting  Side effects that usually do not require medical attention (report to your doctor or health care professional if they continue or are bothersome):  -changes in taste  -cough  -dizziness  -fever  -headache  -sensitivity to light  This list may not describe all possible side effects. Call your doctor for medical advice about side effects. You may report side effects to FDA at 3-501-FDA-2697.  Where should I keep my medicine?  Keep out of the reach of children.  Store at room temperature between 15 and 30 degrees C (59 and 86 degrees F). Protect from moisture. Keep container tightly closed. Throw away any unused medicine after the expiration date.  NOTE: This sheet is a summary. It may not cover all possible information. If you have questions about this medicine, talk to your doctor, pharmacist, or health care provider.  © 2019 [employer] Standard  (2017-02-06 12:52:35)

## 2019-09-12 ENCOUNTER — CLINICAL SUPPORT (OUTPATIENT)
Dept: FAMILY MEDICINE CLINIC | Facility: CLINIC | Age: 39
End: 2019-09-12

## 2019-09-12 DIAGNOSIS — I10 UNCONTROLLED HYPERTENSION: ICD-10-CM

## 2019-09-12 LAB
ANION GAP SERPL CALCULATED.3IONS-SCNC: 11.7 MMOL/L (ref 5–15)
BUN BLD-MCNC: 16 MG/DL (ref 6–20)
BUN/CREAT SERPL: 15.1 (ref 7–25)
CALCIUM SPEC-SCNC: 9.7 MG/DL (ref 8.6–10.5)
CHLORIDE SERPL-SCNC: 102 MMOL/L (ref 98–107)
CO2 SERPL-SCNC: 25.3 MMOL/L (ref 22–29)
CREAT BLD-MCNC: 1.06 MG/DL (ref 0.76–1.27)
GFR SERPL CREATININE-BSD FRML MDRD: 78 ML/MIN/1.73
GLUCOSE BLD-MCNC: 92 MG/DL (ref 65–99)
POTASSIUM BLD-SCNC: 4 MMOL/L (ref 3.5–5.2)
SODIUM BLD-SCNC: 139 MMOL/L (ref 136–145)

## 2019-09-12 PROCEDURE — 80048 BASIC METABOLIC PNL TOTAL CA: CPT | Performed by: NURSE PRACTITIONER

## 2019-09-12 NOTE — PROGRESS NOTES
Subjective   Feliz Randhawa is a 39 y.o. male.     Mr. Randhawa presents s/p hospitalization for hypertensive urgency 9/9/19. During this time patient's Amlodipine was increased from 5 mg to 10 mg/day and he was started on Lisinopril 10 mg/day-he was discharged on 9/10/19 on these medications. Patient states that he went to sleep medicine appointment to be evaluated for suspected sleep apnea today and his blood pressure at their office was 175/112 and they advised him to go see his PCP today. He denies chest pain, SOA, nausea, vomiting, diaphoresis, weakness. Patient denies caffeine intake, Sudafed or stimulant use. Patient is a non-smoker and states that he does not drink alcohol.      Hypertension   This is a new problem. The current episode started 1 to 4 weeks ago. The problem is unchanged. The problem is uncontrolled. Associated symptoms include headaches. Pertinent negatives include no anxiety, blurred vision, chest pain, neck pain, orthopnea, palpitations, peripheral edema, PND, shortness of breath or sweats. Agents associated with hypertension include NSAIDs. Risk factors for coronary artery disease include male gender and obesity. Current antihypertension treatment includes ACE inhibitors and calcium channel blockers. The current treatment provides mild improvement. There are no compliance problems.  suspected sleep apnea.       The following portions of the patient's history were reviewed and updated as appropriate: allergies, current medications, past family history, past medical history, past social history, past surgical history and problem list.    Allergies as of 09/11/2019 - Reviewed 09/11/2019   Allergen Reaction Noted   • Gabapentin Rash 08/11/2016       Current Outpatient Medications on File Prior to Visit   Medication Sig   • cyclobenzaprine (FLEXERIL) 5 MG tablet Take 1 tablet by mouth At Night As Needed for Muscle Spasms.   • naproxen (EC NAPROSYN) 500 MG EC tablet Take 1 tablet by mouth 2 (Two)  Times a Day With Meals.     No current facility-administered medications on file prior to visit.        Review of Systems   Constitutional: Negative for activity change, appetite change, chills, diaphoresis, fatigue, fever and unexpected weight change.   HENT: Negative for congestion, sinus pain and trouble swallowing.    Eyes: Negative for blurred vision.   Respiratory: Positive for apnea (suspects sleep apnea, reports waking up at night gasping for breath and snoring). Negative for chest tightness and shortness of breath.    Cardiovascular: Negative for chest pain, palpitations, orthopnea, leg swelling and PND.   Gastrointestinal: Negative.    Musculoskeletal: Negative for back pain, myalgias and neck pain.   Neurological: Positive for headaches. Negative for dizziness, tremors, seizures, syncope, facial asymmetry, speech difficulty, weakness, light-headedness and numbness.   Psychiatric/Behavioral: The patient is not nervous/anxious.        Objective   Physical Exam   Constitutional: He is oriented to person, place, and time. He appears well-developed and well-nourished.  Non-toxic appearance. He does not have a sickly appearance. No distress.   Neck: No thyromegaly present.   Cardiovascular: Regular rhythm, S1 normal, S2 normal and normal heart sounds.   No murmur heard.  Mild tachycardia   Pulmonary/Chest: Effort normal and breath sounds normal.     Vascular Status -  His right foot exhibits no edema. His left foot exhibits no edema.  Neurological: He is alert and oriented to person, place, and time.   Skin: Skin is warm and dry. He is not diaphoretic.   Psychiatric: He has a normal mood and affect.   Vitals reviewed.    Vitals:    09/11/19 1430   BP: (!) 171/110   Pulse: 115   Resp: 19   Temp: 98.2 °F (36.8 °C)   SpO2: 97%     Body mass index is 32.99 kg/m².    Assessment/Plan   Feliz was seen today for hypertension.    Diagnoses and all orders for this visit:    Uncontrolled hypertension  -     lisinopril  (PRINIVIL,ZESTRIL) 10 MG tablet; Take 1 tablet by mouth 2 (Two) Times a Day.  -     amLODIPine (NORVASC) 10 MG tablet; Take 1 tablet by mouth Daily.  -     nebivolol (BYSTOLIC) 5 MG tablet; Take 1 tablet by mouth Daily.  -     Basic Metabolic Panel; Future  -     Ambulatory Referral to Cardiology    Discussed the nature of the medical condition(s) risks, complications, implications, management, safe and proper use of medications. Encouraged medication compliance, and keeping scheduled follow up appointments with me and any other providers.      Patient advised to monitor blood pressure closely at home and report abnormal results. Patient to go to ER if blood pressure remains elevated and/or worsens or any chest pain, SOA or other concerning symptoms.    F/U with me tomorrow for blood pressure check and BMP.     Keep f/u appointments with sleep medicine.   Keep f/u with PCP Dr. Bartlett on 9/16/19.

## 2019-09-13 NOTE — PROGRESS NOTES
Subjective   Feliz Randhawa is a 39 y.o. male is being seen for consultation today at the request of Pilar Méndez MD for the evaluation of snoring and nonrestorative sleep.  His primary care physician is Dr. Bartlett.    History of Present Illness  Patient complains of not sleeping well for at least 12 years.  He says he goes to bed late between midnight and 1 AM.  He will arises about 7 AM.  He had a history of Bell's palsy in 2005 and seemed to improve but then it returned 3 years ago.  Sometimes has a hard time going to sleep and he thinks he awakens 2-4 times during the night.  Says he cannot sleep on his back.  He was recently hospitalized with a hypertensive emergency.  He has had snoring noted since childhood.  He has had apneas noted in the past 6 months.  He denies waking gasping for breath but admits that he is not rested in the morning.  He has a morning headache 3 to 4 days/week.  He says he has occasional pain in his face and as well as back pain I will keep him awake at night.  He denies falling asleep or sitting quietly during the day.  He denies problems while driving.    He has history of loud snoring and snores in all positions.  Does have noted apneas.  He denies having trouble breathing through his nose day or night but denies breaking his nose.  He has a history of reflux symptoms is on over-the-counter medications.  He denies hypnagogic hallucination or sleep paralysis.  He denies kicking or jerking his legs at night.  He does have a chronic pain as noted above.  He says his weight is up about 12 pounds this year.    He goes to bed between 12 midnight and 1 AM.  He will fall asleep in 15 minutes.  He thinks he awakens 2-4 times during the night.  He gets 3 to 4 hours of sleep arising at 7 AM.  He says he still feels tired.  He has had hypertension noted recently.  He denies any history of diabetes coronary artery disease.  Allergies   Allergen Reactions   • Gabapentin Rash           Current Outpatient Medications:   •  cyclobenzaprine (FLEXERIL) 5 MG tablet, Take 1 tablet by mouth At Night As Needed for Muscle Spasms., Disp: 30 tablet, Rfl: 1  •  naproxen (EC NAPROSYN) 500 MG EC tablet, Take 1 tablet by mouth 2 (Two) Times a Day With Meals., Disp: 28 tablet, Rfl: 0  •  amLODIPine (NORVASC) 10 MG tablet, Take 1 tablet by mouth Daily., Disp: 30 tablet, Rfl: 0  •  lisinopril (PRINIVIL,ZESTRIL) 10 MG tablet, Take 1 tablet by mouth 2 (Two) Times a Day., Disp: 60 tablet, Rfl: 0  •  nebivolol (BYSTOLIC) 5 MG tablet, Take 1 tablet by mouth Daily., Disp: 30 tablet, Rfl: 0    Social History    Tobacco Use      Smoking status: Never Smoker      Smokeless tobacco: Never Used       Social History     Substance and Sexual Activity   Alcohol Use  estimates 1 drink per month       Caffeine: He has 1 cup of coffee a day    Past Medical History:   Diagnosis Date   • Bell's palsy    • Hypertension    • Obesity        Past Surgical History:   Procedure Laterality Date   • NO PAST SURGERIES         Family History   Problem Relation Age of Onset   • Diabetes Mother    • Nephrolithiasis Mother    • No Known Problems Father    Family history is positive for hypertension    The following portions of the patient's history were reviewed and updated as appropriate: allergies, current medications, past family history, past medical history, past social history, past surgical history and problem list.    Review of Systems   Constitutional: Positive for fatigue.   HENT: Negative.    Eyes: Positive for visual disturbance.   Respiratory: Positive for shortness of breath.    Cardiovascular: Negative.    Gastrointestinal:        He complains of frequent heartburn   Endocrine: Negative.    Genitourinary: Positive for frequency.   Musculoskeletal: Positive for back pain and joint swelling.   Skin: Negative.    Allergic/Immunologic: Negative.    Neurological:        He has a history of Bell's palsy   Hematological: Negative.   "  Psychiatric/Behavioral: Negative.    Canterbury score is 1/24    Objective     BP (!) 175/112   Pulse 93   Ht 167.6 cm (66\")   Wt 92.2 kg (203 lb 3.2 oz)   SpO2 97%   BMI 32.80 kg/m²      Physical Exam   Constitutional: He is oriented to person, place, and time. He appears well-developed and well-nourished.   He is obese.   HENT:   Head: Normocephalic and atraumatic.   He has Mallampati class III anatomy.  He has tonsillar hypertrophy grade 3.   Eyes: EOM are normal. Pupils are equal, round, and reactive to light.   Neck: Normal range of motion. Neck supple.   Cardiovascular: Normal rate, regular rhythm and normal heart sounds.   Pulmonary/Chest: Effort normal and breath sounds normal.   Abdominal: Soft. Bowel sounds are normal.   Musculoskeletal: Normal range of motion. He exhibits no edema.   Neurological: He is alert and oriented to person, place, and time.   Skin: Skin is warm and dry.   Psychiatric: He has a normal mood and affect. His behavior is normal.         Assessment/Plan   Feliz was seen today for sleeping problem.    Diagnoses and all orders for this visit:    Snoring  -     Home Sleep Study; Future    Obstructive sleep apnea, adult  -     Home Sleep Study; Future    Class 1 obesity due to excess calories without serious comorbidity with body mass index (BMI) of 32.0 to 32.9 in adult    Patient presents with a history of snoring and nonrestorative sleep.  He has had difficult to control blood pressure.  I think he gives an excellent story for obstructive sleep apnea.  We will plan to proceed to home sleep testing.  I discussed possible therapies including CPAP, weight control, oral appliance, and surgery.  We have also discussed the long-term consequences of untreated obstructive sleep apnea.  He is encouraged to lose weight.  He is encouraged to avoid alcohol and sedatives close to bedtime.  He is encouraged to practice lateral position sleep.    The patient also has rather poor sleep hygiene.  " We discussed measures to improve his sleep hygiene.  He needs to try to get more hours of sleep and is well that is trying to get better sleep.         Raphael Olivas MD [facility]  Sleep Medicine  Pulmonary and Critical Care Medicine

## 2019-09-15 ENCOUNTER — TELEPHONE (OUTPATIENT)
Dept: FAMILY MEDICINE CLINIC | Facility: CLINIC | Age: 39
End: 2019-09-15

## 2019-09-15 NOTE — TELEPHONE ENCOUNTER
Pt advised and stated that his BP today is 139/89 with a HR of 72.     ----- Message from ELAYNE Cameron sent at 9/14/2019  9:58 AM EDT -----  Please call and let him know that his labs were normal. Please ask how his blood pressure is.

## 2019-09-16 ENCOUNTER — OFFICE VISIT (OUTPATIENT)
Dept: FAMILY MEDICINE CLINIC | Facility: CLINIC | Age: 39
End: 2019-09-16

## 2019-09-16 VITALS
RESPIRATION RATE: 22 BRPM | HEIGHT: 66 IN | BODY MASS INDEX: 32.85 KG/M2 | OXYGEN SATURATION: 98 % | HEART RATE: 80 BPM | SYSTOLIC BLOOD PRESSURE: 142 MMHG | TEMPERATURE: 97.7 F | DIASTOLIC BLOOD PRESSURE: 88 MMHG | WEIGHT: 204.4 LBS

## 2019-09-16 DIAGNOSIS — I10 UNCONTROLLED HYPERTENSION: Primary | ICD-10-CM

## 2019-09-16 DIAGNOSIS — G47.33 OSA (OBSTRUCTIVE SLEEP APNEA): ICD-10-CM

## 2019-09-16 DIAGNOSIS — G44.86 CERVICOGENIC HEADACHE: ICD-10-CM

## 2019-09-16 DIAGNOSIS — F41.1 GAD (GENERALIZED ANXIETY DISORDER): ICD-10-CM

## 2019-09-16 PROCEDURE — 99495 TRANSJ CARE MGMT MOD F2F 14D: CPT | Performed by: FAMILY MEDICINE

## 2019-09-16 RX ORDER — AMLODIPINE BESYLATE 10 MG/1
10 TABLET ORAL
Qty: 90 TABLET | Refills: 3 | Status: SHIPPED | OUTPATIENT
Start: 2019-09-16 | End: 2020-06-29 | Stop reason: SDUPTHER

## 2019-09-16 RX ORDER — LISINOPRIL 20 MG/1
20 TABLET ORAL DAILY
Qty: 90 TABLET | Refills: 3 | Status: SHIPPED | OUTPATIENT
Start: 2019-09-16 | End: 2020-06-29 | Stop reason: SDUPTHER

## 2019-09-16 RX ORDER — NEBIVOLOL 5 MG/1
5 TABLET ORAL DAILY
Qty: 90 TABLET | Refills: 3 | COMMUNITY
Start: 2019-09-16 | End: 2019-10-10

## 2019-09-17 NOTE — PROGRESS NOTES
Transitional Care Follow Up Visit  Subjective     Feliz Randhawa is a 39 y.o. male who presents for a transitional care management visit.  Within 48 business hours after discharge our office contacted him via telephone to coordinate his care and needs. I reviewed and discussed the details of that call along with the discharge summary, hospital problems, inpatient lab results, inpatient diagnostic studies, and consultation reports with Feliz.  Current outpatient and discharge medications have been reconciled for the patient.    Date of TCM Phone Call 9/11/2019   [facility]   Date of Admission 9/10/2019   Date of Discharge 9/10/2019   Discharge Disposition Home or Self Care     Risk for Readmission (LACE) Score: 4 (9/10/2019  6:01 AM)    History of Present Illness   Course During Hospital Stay:  He was seen at [facility] ED secondary to hypertensive urgency and admitted.  He was started on bp medications with a good response.  Imaging of his head and cervical spine were acquired with no acute findings.  He was dismissed home with medications.  His blood pressure was still not controlled so he saw our APRN.  Medications were adjusted.  He is tolerating his medications well and reports better blood pressure control.  He describes stress in his life.  He plans to continue pursuing better quality sleep by proceeding with his ordered home sleep study.  He is trying to introduce lifestyle changes to lose weight.    Review of Systems   Constitutional: Positive for fatigue. Negative for chills and fever.   HENT: Negative for nosebleeds.    Eyes: Negative for visual disturbance.   Respiratory: Negative for shortness of breath.    Cardiovascular: Negative for chest pain, palpitations and leg swelling.   Gastrointestinal: Negative for abdominal pain.   Endocrine: Negative for polydipsia, polyphagia and polyuria.   Genitourinary: Negative for difficulty urinating.   Musculoskeletal: Positive for neck pain.    Skin: Negative for rash.   Neurological: Positive for headaches. Negative for dizziness and syncope.   Hematological: Does not bruise/bleed easily.   Psychiatric/Behavioral: Positive for sleep disturbance. Negative for confusion. The patient is nervous/anxious.        Objective   Physical Exam   Constitutional: He is oriented to person, place, and time. He appears well-developed and well-nourished. He is cooperative.   HENT:   Head: Normocephalic and atraumatic.   Right Ear: Hearing and external ear normal.   Left Ear: Hearing and external ear normal.   Nose: Nose normal.   Mouth/Throat: Uvula is midline, oropharynx is clear and moist and mucous membranes are normal.   Eyes: Conjunctivae and EOM are normal. Pupils are equal, round, and reactive to light. No scleral icterus.   Neck: Trachea normal and normal range of motion. Neck supple. No JVD present. Muscular tenderness present. No spinous process tenderness present. Carotid bruit is not present. No thyromegaly present.   Cardiovascular: Normal rate, regular rhythm, normal heart sounds and intact distal pulses.   Pulmonary/Chest: Effort normal and breath sounds normal.   Abdominal: Soft. Bowel sounds are normal. There is no hepatosplenomegaly. There is no tenderness.   Musculoskeletal: Normal range of motion.        Cervical back: He exhibits spasm.   Lymphadenopathy:     He has no cervical adenopathy.   Neurological: He is alert and oriented to person, place, and time. He has normal strength and normal reflexes. No sensory deficit. Gait normal.   Skin: Skin is warm and dry.   Psychiatric: He has a normal mood and affect. His speech is normal and behavior is normal. Judgment and thought content normal. Cognition and memory are normal.   Nursing note and vitals reviewed.      Assessment/Plan   Diagnoses and all orders for this visit:    Uncontrolled hypertension with recent hypertensive urgency hospital admission  -     amLODIPine (NORVASC) 10 MG tablet; Take 1  tablet by mouth Daily. #90 c 3 RF  -     lisinopril (PRINIVIL,ZESTRIL) 20 MG tablet; Take 1 tablet by mouth Daily. #90 c 3 RF  -     nebivolol (BYSTOLIC) 5 MG tablet; Take 1 tablet by mouth Daily. #90 c 3 RF  - Healthy heart diet / DASH diet  - Low dose aspirin once daily  - Low sodium diet  - Daily aerobic exercise > 40' > 3 x / week  - Routine blood pressure monitoring  - [place] Heart Disease and Stroke Prevention Task Force pamphlet reviewed and administered.  - Re-evaluate in 4-6 weeks    Cervicogenic headache  - Naproxen 500 mg po bid with food prn  - He deferred a written script for Physical Therapy to evaluate and treat  - Cervical spine range of motion stretches daily  - Upper back strengthening exercises  - Gentle massage prn  - Posture mechanics reviewed  - Avoid overuse  - Recent x-ray imaging reviewed  - Consider MRI with any radiculopathy symptoms    ASIA (generalized anxiety disorder)  - Routine counseling recommended  - Sleep / wake hygiene  - Daily aerobic exercise  - Avoid excessive caffeine/etoh use  - Re-evaluate in four-six weeks    CHARLOTTE (obstructive sleep apnea)        - Proceed with sleep study as ordered        - Weight loss        - Side sleep position    RTC in 4-6 weeks

## 2019-10-09 RX ORDER — LISINOPRIL 10 MG/1
TABLET ORAL
Qty: 60 TABLET | Refills: 0 | OUTPATIENT
Start: 2019-10-09

## 2019-10-09 NOTE — TELEPHONE ENCOUNTER
We received this medication refill request I believe by mistake so I just wanted to forward it to you. Thank you.

## 2019-10-10 ENCOUNTER — OFFICE VISIT (OUTPATIENT)
Dept: NEUROLOGY | Facility: CLINIC | Age: 39
End: 2019-10-10

## 2019-10-10 VITALS
HEART RATE: 87 BPM | OXYGEN SATURATION: 99 % | BODY MASS INDEX: 32.78 KG/M2 | SYSTOLIC BLOOD PRESSURE: 138 MMHG | WEIGHT: 204 LBS | HEIGHT: 66 IN | DIASTOLIC BLOOD PRESSURE: 82 MMHG

## 2019-10-10 DIAGNOSIS — M54.2 MUSCULOSKELETAL NECK PAIN: Primary | ICD-10-CM

## 2019-10-10 DIAGNOSIS — G51.0 BELL'S PALSY: ICD-10-CM

## 2019-10-10 DIAGNOSIS — R06.83 SNORING: ICD-10-CM

## 2019-10-10 DIAGNOSIS — M54.81 BILATERAL OCCIPITAL NEURALGIA: ICD-10-CM

## 2019-10-10 PROCEDURE — 99213 OFFICE O/P EST LOW 20 MIN: CPT | Performed by: NURSE PRACTITIONER

## 2019-10-10 NOTE — PROGRESS NOTES
Subjective:     Patient ID: Feliz Randhawa is a 39 y.o. male.    CC:   Chief Complaint   Patient presents with   • Neck Pain       HPI:   History of Present Illness     This is a pleasant 39-year-old male who presents for 4-week follow-up.  Last visit we sent him to the hospital for hypertensive urgency, headache and vision disturbances.  He was admitted to the hospital from 9/9/2019 at 9/10/2019 and had a CT of the head which showed no abnormalities.  He had additional hypertensive medications added.  He tells me he is feeling much better overall.  His headaches have resolved.  His neck pain is a lot better and he feels less stress.  He did have an x-ray and patient and that showed no acute process.  He denies any more occipital neuralgia.  He has a chronic Bell's palsy with the left facial spasms but this is not changed.  He did see Dr. Olivas with sleep clinic and he needs to reschedule his sleep study which he canceled last week.  His blood pressure has been better controlled and he is seeing his primary care provider frequently to ensure this improves.  His neck feels better overall and he is doing home exercises shared by his primary care provider.  He does not want to try physical therapy due to cost.  He did have a carotid ultrasound inpatient and had no hemodynamically significant stenosis.  Blood pressure today is 138/82.  He feels so much better today.  He did have his eyes checked and he was told everything looked good with his vision.  He does continue to snore.  He stopped his muscle relaxer since his pain has been better.    He was initially seen in our office in 2016 for chronic left Bell's palsy present since 2005.  He also had neuralgia as in the left face, left arm and somewhat moving to the right side at that time.  He did have an MRI of the brain without contrast on 8/18/2016 which was within normal limits and cranial nerves were within normal limits.  Previous labs in 2016 showed CRP normal,  methylmalonic acid normal sed rate normal vitamin B12 on the low side at 342, hemoglobin A1c 4.9%, CMP within normal limits, DOUG with reflex negative, CBC with differential within normal limits and TSH on the low side of normal with normal T3 uptake and total T4.  Previously was treated with amitriptyline and gabapentin and did not tolerate either of these medications.  We did try Cymbalta but he also was not able to tolerate this.    The following portions of the patient's history were reviewed and updated as appropriate: allergies, current medications, past family history, past medical history, past social history, past surgical history and problem list.    Past Medical History:   Diagnosis Date   • Bell's palsy    • ASIA (generalized anxiety disorder)    • Hypertension    • Obesity        Past Surgical History:   Procedure Laterality Date   • NO PAST SURGERIES         Social History     Socioeconomic History   • Marital status: Significant Other     Spouse name: Sasha   • Number of children: 1   • Years of education: H.S.   • Highest education level: High school graduate   Occupational History   • Occupation: Maintenance     Employer: [employer]   Tobacco Use   • Smoking status: Never Smoker   • Smokeless tobacco: Never Used   Substance and Sexual Activity   • Alcohol use: No   • Drug use: No   • Sexual activity: Yes     Partners: Female       Family History   Problem Relation Age of Onset   • Diabetes Mother    • Nephrolithiasis Mother    • No Known Problems Father         Review of Systems   Constitutional: Negative for chills, fatigue, fever and unexpected weight change.   HENT: Negative for ear pain, hearing loss, nosebleeds, rhinorrhea and sore throat.    Eyes: Negative for photophobia, pain, discharge, itching and visual disturbance.   Respiratory: Negative for cough, chest tightness, shortness of breath and wheezing.    Cardiovascular: Negative for chest pain, palpitations and leg swelling.    Gastrointestinal: Negative for abdominal pain, blood in stool, constipation, diarrhea, nausea and vomiting.   Genitourinary: Negative for dysuria, frequency, hematuria and urgency.   Musculoskeletal: Negative for arthralgias, back pain, gait problem, joint swelling, myalgias, neck pain and neck stiffness.   Skin: Negative for rash and wound.   Allergic/Immunologic: Negative for environmental allergies and food allergies.   Neurological: Negative for dizziness, tremors, seizures, syncope, speech difficulty, weakness, light-headedness, numbness and headaches.   Hematological: Negative for adenopathy. Does not bruise/bleed easily.   Psychiatric/Behavioral: Negative for agitation, confusion, decreased concentration, hallucinations, sleep disturbance and suicidal ideas. The patient is not nervous/anxious.    All other systems reviewed and are negative.       Objective:    Neurologic Exam     Mental Status   Oriented to person, place, and time.   Level of consciousness: alert    Cranial Nerves     CN II   Visual fields full to confrontation.     CN III, IV, VI   Pupils are equal, round, and reactive to light.  Extraocular motions are normal.     CN V   Facial sensation intact.     CN VII   Right facial weakness: none  Left facial weakness: peripheral (chronic left facial weakness from Bell's palsy)    CN VIII   CN VIII normal.     CN IX, X   CN IX normal.   CN X normal.     CN XI   CN XI normal.     CN XII   CN XII normal.     Motor Exam   Muscle bulk: normal  Overall muscle tone: normal    Strength   Strength 5/5 throughout.     Gait, Coordination, and Reflexes     Gait  Gait: normal    Coordination   Finger to nose coordination: normal    Tremor   Resting tremor: absent  Intention tremor: absent  Action tremor: absent    Reflexes   Right brachioradialis: 2+  Left brachioradialis: 2+  Right biceps: 2+  Left biceps: 2+  Right triceps: 2+  Left triceps: 2+  Right patellar: 2+  Left patellar: 2+  Right achilles: 2+  Left  achilles: 2+  Right : 2+  Left : 2+      Physical Exam   Constitutional: He is oriented to person, place, and time.   Eyes: EOM are normal. Pupils are equal, round, and reactive to light.   Neck: Muscular tenderness present. No spinous process tenderness present. Decreased range of motion present.   Neurological: He is oriented to person, place, and time. He has normal strength. He has a normal Finger-Nose-Finger Test. Gait normal.   Reflex Scores:       Tricep reflexes are 2+ on the right side and 2+ on the left side.       Bicep reflexes are 2+ on the right side and 2+ on the left side.       Brachioradialis reflexes are 2+ on the right side and 2+ on the left side.       Patellar reflexes are 2+ on the right side and 2+ on the left side.       Achilles reflexes are 2+ on the right side and 2+ on the left side.      Assessment/Plan:       Feliz was seen today for neck pain.    Diagnoses and all orders for this visit:    Musculoskeletal neck pain  Comments:  Xray of neck normal. neck pain much better with HTN control.    Bilateral occipital neuralgia  Comments:  Now resolved with HTN control    Bell's palsy  Comments:  Stable, chronic    Snoring  Comments:  STRONGLY ENCOURAGED TO R/S SLEEP STUDY AND F/U WITH SLEEP CLINIC-STRONG SUSPICION OF CHARLOTTE         I have reviewed his hospital admission and discharge summary.  Reviewed CT of the head and agree with radiologist interpretation.  Hypertension appears better controlled.  He is seeing his primary care provider frequently.  I have strongly encouraged him to reschedule his sleep study and follow-up with sleep clinic for strong suspicion of obstructive sleep apnea.  His headaches and neck pain seem better.  His Bell's palsy is stable.  We will follow-up in 6 months for reevaluation or sooner if needed. Reviewed medications, potential side effects and signs and symptoms to report. Discussed risk versus benefits of treatment plan with patient and/or  family-including medications, labs and radiology that may be ordered. Addressed questions and concerns during visit. Patient and/or family verbalized understanding and agree with plan.    During this visit the following were done:  Labs Reviewed [x]    Labs Ordered []    Radiology Reports Reviewed [x]    Radiology Ordered []    PCP Records Reviewed []    Referring Provider Records Reviewed []    ER Records Reviewed [x]    Hospital Records Reviewed [x]    History Obtained From Family []    Radiology Images Reviewed [x]    Other Reviewed [x]    Records Requested []      EMR Dragon/Transcription Disclaimer:  Much of this encounter note is an electronic transcription of spoken language to printed text. Electronic transcription of spoken language may permit erroneous words or phrases to be inadvertently transcribed. Although I have reviewed the note for such errors, some may still exist in this documentation.      Camilla Durham, APRN  10/10/2019

## 2020-06-29 DIAGNOSIS — I10 UNCONTROLLED HYPERTENSION: ICD-10-CM

## 2020-06-29 RX ORDER — AMLODIPINE BESYLATE 10 MG/1
10 TABLET ORAL
Qty: 90 TABLET | Refills: 3 | Status: SHIPPED | OUTPATIENT
Start: 2020-06-29 | End: 2021-09-28 | Stop reason: SDUPTHER

## 2020-06-29 RX ORDER — LISINOPRIL 20 MG/1
20 TABLET ORAL DAILY
Qty: 90 TABLET | Refills: 3 | Status: SHIPPED | OUTPATIENT
Start: 2020-06-29

## 2021-09-10 ENCOUNTER — TELEPHONE (OUTPATIENT)
Dept: FAMILY MEDICINE CLINIC | Facility: CLINIC | Age: 41
End: 2021-09-10

## 2021-09-10 NOTE — TELEPHONE ENCOUNTER
Caller: Feliz Randhawa    Relationship: Self    Best call back number: [phone]    What is the best time to reach you: ANYTIME    Who are you requesting to speak with (clinical staff, provider,  specific staff member): GULSHAN GREY    What was the call regarding:   PATIENT WAS IS WAITING ON HIS TEST RESULTS BACK FROM HIS COVID TEST. HE HAS ALL THE SYSTEMS AND HIS SON IS POSITIVE. HE'S BEEN RUNNING A FEVER , ADVIL HASN'T BEEN WORKING FOR HIS FEVER. HE WOULD LIKE TO KNOW WHAT ELSE HE CAN TAKE TO HELP BREAK HIS FEVER. HE  ALSO WOULD LIKE TO MAKE GULSHAN GREY HIS PCP     Do you require a callback: YES

## 2021-09-28 DIAGNOSIS — I10 UNCONTROLLED HYPERTENSION: ICD-10-CM

## 2021-09-29 RX ORDER — AMLODIPINE BESYLATE 10 MG/1
10 TABLET ORAL
Qty: 30 TABLET | Refills: 1 | Status: SHIPPED | OUTPATIENT
Start: 2021-09-29

## 2021-10-07 DIAGNOSIS — I10 UNCONTROLLED HYPERTENSION: ICD-10-CM

## 2021-10-08 RX ORDER — LISINOPRIL 20 MG/1
20 TABLET ORAL DAILY
Qty: 90 TABLET | Refills: 3 | OUTPATIENT
Start: 2021-10-08

## 2021-10-10 NOTE — TELEPHONE ENCOUNTER
Patient has not been seen in over a year and will need to schedule an appointment for an annual physical exam in order to receive further refills.  Once appointment has been scheduled I will send in enough medication to make it to the appointment time.    
Pt seeing another pcp   
2